# Patient Record
Sex: FEMALE | Race: BLACK OR AFRICAN AMERICAN | NOT HISPANIC OR LATINO | Employment: UNEMPLOYED | URBAN - METROPOLITAN AREA
[De-identification: names, ages, dates, MRNs, and addresses within clinical notes are randomized per-mention and may not be internally consistent; named-entity substitution may affect disease eponyms.]

---

## 2023-04-27 ENCOUNTER — OFFICE VISIT (OUTPATIENT)
Dept: PRIMARY CARE | Facility: CLINIC | Age: 33
End: 2023-04-27
Payer: COMMERCIAL

## 2023-04-27 VITALS
SYSTOLIC BLOOD PRESSURE: 158 MMHG | WEIGHT: 136.4 LBS | TEMPERATURE: 97.3 F | OXYGEN SATURATION: 97 % | BODY MASS INDEX: 22.73 KG/M2 | HEART RATE: 96 BPM | HEIGHT: 65 IN | DIASTOLIC BLOOD PRESSURE: 100 MMHG

## 2023-04-27 DIAGNOSIS — Z00.00 HEALTH CARE MAINTENANCE: ICD-10-CM

## 2023-04-27 DIAGNOSIS — I10 HYPERTENSION, UNSPECIFIED TYPE: ICD-10-CM

## 2023-04-27 DIAGNOSIS — M25.562 LEFT KNEE PAIN, UNSPECIFIED CHRONICITY: Primary | ICD-10-CM

## 2023-04-27 PROCEDURE — 99213 OFFICE O/P EST LOW 20 MIN: CPT | Performed by: STUDENT IN AN ORGANIZED HEALTH CARE EDUCATION/TRAINING PROGRAM

## 2023-04-27 PROCEDURE — 3077F SYST BP >= 140 MM HG: CPT | Performed by: STUDENT IN AN ORGANIZED HEALTH CARE EDUCATION/TRAINING PROGRAM

## 2023-04-27 PROCEDURE — 3080F DIAST BP >= 90 MM HG: CPT | Performed by: STUDENT IN AN ORGANIZED HEALTH CARE EDUCATION/TRAINING PROGRAM

## 2023-04-27 RX ORDER — AMLODIPINE BESYLATE 5 MG/1
5 TABLET ORAL DAILY
Qty: 30 TABLET | Refills: 5 | Status: SHIPPED | OUTPATIENT
Start: 2023-04-27 | End: 2023-12-04 | Stop reason: DRUGHIGH

## 2023-04-27 RX ORDER — NAPROXEN 500 MG/1
500 TABLET ORAL 2 TIMES DAILY PRN
Qty: 60 TABLET | Refills: 0 | Status: SHIPPED | OUTPATIENT
Start: 2023-04-27 | End: 2023-05-27

## 2023-04-27 ASSESSMENT — PAIN SCALES - GENERAL: PAINLEVEL: 0-NO PAIN
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
## 2023-10-10 PROBLEM — O13.9 GESTATIONAL HYPERTENSION (HHS-HCC): Status: ACTIVE | Noted: 2023-10-10

## 2023-10-10 PROBLEM — O09.93 ENCOUNTER FOR SUPERVISION OF HIGH RISK PREGNANCY IN THIRD TRIMESTER, ANTEPARTUM (HHS-HCC): Status: ACTIVE | Noted: 2023-10-10

## 2023-10-10 PROBLEM — T78.40XA ALLERGIES: Status: ACTIVE | Noted: 2023-10-10

## 2023-10-10 PROBLEM — Z87.59 HISTORY OF PRE-ECLAMPSIA: Status: ACTIVE | Noted: 2023-10-10

## 2023-10-10 PROBLEM — O09.893 HISTORY OF PRETERM DELIVERY, CURRENTLY PREGNANT IN THIRD TRIMESTER (HHS-HCC): Status: ACTIVE | Noted: 2023-10-10

## 2023-10-10 RX ORDER — CLINDAMYCIN PHOSPHATE 10 MG/G
GEL TOPICAL
COMMUNITY
Start: 2021-08-23 | End: 2023-11-01 | Stop reason: ALTCHOICE

## 2023-10-10 RX ORDER — GUAIFENESIN 600 MG/1
600 TABLET, EXTENDED RELEASE ORAL EVERY 12 HOURS
COMMUNITY
Start: 2022-04-26 | End: 2023-11-01 | Stop reason: ALTCHOICE

## 2023-10-10 RX ORDER — IBUPROFEN 600 MG/1
600 TABLET ORAL EVERY 6 HOURS PRN
COMMUNITY
Start: 2022-05-02 | End: 2023-11-01 | Stop reason: ALTCHOICE

## 2023-10-10 RX ORDER — DOCUSATE SODIUM 100 MG/1
100 CAPSULE, LIQUID FILLED ORAL 2 TIMES DAILY PRN
COMMUNITY
Start: 2022-05-02 | End: 2023-11-01 | Stop reason: ALTCHOICE

## 2023-10-10 RX ORDER — HYDROCORTISONE 1 %
CREAM (GRAM) TOPICAL
COMMUNITY
Start: 2022-03-31 | End: 2023-11-01 | Stop reason: ALTCHOICE

## 2023-10-10 RX ORDER — IPRATROPIUM BROMIDE 21 UG/1
2 SPRAY, METERED NASAL 2 TIMES DAILY
COMMUNITY
Start: 2017-02-25 | End: 2023-11-01 | Stop reason: ALTCHOICE

## 2023-10-10 RX ORDER — NIFEDIPINE 60 MG/1
1 TABLET, FILM COATED, EXTENDED RELEASE ORAL DAILY
COMMUNITY
Start: 2022-05-02 | End: 2023-11-01 | Stop reason: ALTCHOICE

## 2023-10-10 RX ORDER — LORATADINE 10 MG
1 TABLET,DISINTEGRATING ORAL DAILY
COMMUNITY
Start: 2022-04-26 | End: 2023-11-01 | Stop reason: ALTCHOICE

## 2023-10-10 RX ORDER — NAPROXEN 500 MG/1
500 TABLET ORAL
COMMUNITY
Start: 2015-10-02 | End: 2023-11-01 | Stop reason: ALTCHOICE

## 2023-10-10 RX ORDER — OXYCODONE AND ACETAMINOPHEN 5; 325 MG/1; MG/1
1 TABLET ORAL EVERY 4 HOURS PRN
COMMUNITY
Start: 2015-10-02 | End: 2023-11-01 | Stop reason: ALTCHOICE

## 2023-10-10 RX ORDER — BENZOYL PEROXIDE 50 MG/ML
LIQUID TOPICAL
COMMUNITY
Start: 2021-12-01 | End: 2023-11-01 | Stop reason: ALTCHOICE

## 2023-10-10 RX ORDER — TRAMADOL HYDROCHLORIDE 50 MG/1
50 TABLET ORAL EVERY 6 HOURS PRN
COMMUNITY
Start: 2023-02-25 | End: 2023-11-01 | Stop reason: ALTCHOICE

## 2023-10-11 ENCOUNTER — APPOINTMENT (OUTPATIENT)
Dept: OBSTETRICS AND GYNECOLOGY | Facility: HOSPITAL | Age: 33
End: 2023-10-11
Payer: COMMERCIAL

## 2023-11-01 ENCOUNTER — LAB (OUTPATIENT)
Dept: LAB | Facility: LAB | Age: 33
End: 2023-11-01
Payer: COMMERCIAL

## 2023-11-01 ENCOUNTER — INITIAL PRENATAL (OUTPATIENT)
Dept: OBSTETRICS AND GYNECOLOGY | Facility: HOSPITAL | Age: 33
End: 2023-11-01
Payer: COMMERCIAL

## 2023-11-01 VITALS — DIASTOLIC BLOOD PRESSURE: 86 MMHG | BODY MASS INDEX: 22.96 KG/M2 | WEIGHT: 138 LBS | SYSTOLIC BLOOD PRESSURE: 145 MMHG

## 2023-11-01 DIAGNOSIS — Z3A.12 12 WEEKS GESTATION OF PREGNANCY (HHS-HCC): ICD-10-CM

## 2023-11-01 DIAGNOSIS — O10.919 CHRONIC HYPERTENSION AFFECTING PREGNANCY (HHS-HCC): ICD-10-CM

## 2023-11-01 DIAGNOSIS — Z28.21 INFLUENZA VACCINATION DECLINED: ICD-10-CM

## 2023-11-01 DIAGNOSIS — O10.919 CHRONIC HYPERTENSION DURING PREGNANCY (HHS-HCC): ICD-10-CM

## 2023-11-01 DIAGNOSIS — O13.9 GESTATIONAL HYPERTENSION, ANTEPARTUM (HHS-HCC): ICD-10-CM

## 2023-11-01 DIAGNOSIS — Z11.3 ROUTINE SCREENING FOR STI (SEXUALLY TRANSMITTED INFECTION): ICD-10-CM

## 2023-11-01 DIAGNOSIS — Z00.00 HEALTH CARE MAINTENANCE: ICD-10-CM

## 2023-11-01 DIAGNOSIS — O09.893 HISTORY OF PRETERM DELIVERY, CURRENTLY PREGNANT IN THIRD TRIMESTER (HHS-HCC): ICD-10-CM

## 2023-11-01 DIAGNOSIS — O09.899 SUPERVISION OF OTHER HIGH RISK PREGNANCIES, UNSPECIFIED TRIMESTER (HHS-HCC): Primary | ICD-10-CM

## 2023-11-01 DIAGNOSIS — O09.899 SUPERVISION OF OTHER HIGH RISK PREGNANCIES, UNSPECIFIED TRIMESTER (HHS-HCC): ICD-10-CM

## 2023-11-01 DIAGNOSIS — Z87.59 HISTORY OF PRE-ECLAMPSIA: ICD-10-CM

## 2023-11-01 PROBLEM — O09.93 ENCOUNTER FOR SUPERVISION OF HIGH RISK PREGNANCY IN THIRD TRIMESTER, ANTEPARTUM (HHS-HCC): Status: RESOLVED | Noted: 2023-10-10 | Resolved: 2023-11-01

## 2023-11-01 PROBLEM — T78.40XA ALLERGIES: Status: RESOLVED | Noted: 2023-10-10 | Resolved: 2023-11-01

## 2023-11-01 LAB
ABO GROUP (TYPE) IN BLOOD: NORMAL
ALBUMIN SERPL BCP-MCNC: 3.9 G/DL (ref 3.4–5)
ALP SERPL-CCNC: 47 U/L (ref 33–110)
ALT SERPL W P-5'-P-CCNC: 19 U/L (ref 7–45)
ANION GAP SERPL CALC-SCNC: 12 MMOL/L (ref 10–20)
ANTIBODY SCREEN: NORMAL
AST SERPL W P-5'-P-CCNC: 18 U/L (ref 9–39)
BILIRUB SERPL-MCNC: 0.4 MG/DL (ref 0–1.2)
BUN SERPL-MCNC: 8 MG/DL (ref 6–23)
CALCIUM SERPL-MCNC: 9.9 MG/DL (ref 8.6–10.6)
CHLORIDE SERPL-SCNC: 103 MMOL/L (ref 98–107)
CO2 SERPL-SCNC: 25 MMOL/L (ref 21–32)
CREAT SERPL-MCNC: 0.54 MG/DL (ref 0.5–1.05)
ERYTHROCYTE [DISTWIDTH] IN BLOOD BY AUTOMATED COUNT: 13.9 % (ref 11.5–14.5)
GFR SERPL CREATININE-BSD FRML MDRD: >90 ML/MIN/1.73M*2
GLUCOSE SERPL-MCNC: 70 MG/DL (ref 74–99)
HBV SURFACE AG SERPL QL IA: NONREACTIVE
HCT VFR BLD AUTO: 35.1 % (ref 36–46)
HCV AB SER QL: NONREACTIVE
HGB BLD-MCNC: 11.6 G/DL (ref 12–16)
HIV 1+2 AB+HIV1 P24 AG SERPL QL IA: NONREACTIVE
MCH RBC QN AUTO: 30.9 PG (ref 26–34)
MCHC RBC AUTO-ENTMCNC: 33 G/DL (ref 32–36)
MCV RBC AUTO: 94 FL (ref 80–100)
NRBC BLD-RTO: 0 /100 WBCS (ref 0–0)
PLATELET # BLD AUTO: 355 X10*3/UL (ref 150–450)
POTASSIUM SERPL-SCNC: 4.4 MMOL/L (ref 3.5–5.3)
PREGNANCY TEST URINE, POC: POSITIVE
PROT SERPL-MCNC: 6.7 G/DL (ref 6.4–8.2)
RBC # BLD AUTO: 3.75 X10*6/UL (ref 4–5.2)
REFLEX ADDED, ANEMIA PANEL: NORMAL
RH FACTOR (ANTIGEN D): NORMAL
RUBV IGG SERPL IA-ACNC: 0.7 IA
RUBV IGG SERPL QL IA: NEGATIVE
SODIUM SERPL-SCNC: 136 MMOL/L (ref 136–145)
T PALLIDUM AB SER QL: NONREACTIVE
URATE SERPL-MCNC: <1.5 MG/DL (ref 2.3–6.7)
VARICELLA ZOSTER IGG INDEX: 5.2 IA
VZV IGG SER QL IA: POSITIVE
WBC # BLD AUTO: 10.5 X10*3/UL (ref 4.4–11.3)

## 2023-11-01 PROCEDURE — 80053 COMPREHEN METABOLIC PANEL: CPT

## 2023-11-01 PROCEDURE — 86317 IMMUNOASSAY INFECTIOUS AGENT: CPT

## 2023-11-01 PROCEDURE — 86780 TREPONEMA PALLIDUM: CPT

## 2023-11-01 PROCEDURE — 87340 HEPATITIS B SURFACE AG IA: CPT

## 2023-11-01 PROCEDURE — 86803 HEPATITIS C AB TEST: CPT

## 2023-11-01 PROCEDURE — 84550 ASSAY OF BLOOD/URIC ACID: CPT

## 2023-11-01 PROCEDURE — 87086 URINE CULTURE/COLONY COUNT: CPT | Performed by: ADVANCED PRACTICE MIDWIFE

## 2023-11-01 PROCEDURE — 83020 HEMOGLOBIN ELECTROPHORESIS: CPT

## 2023-11-01 PROCEDURE — 86900 BLOOD TYPING SEROLOGIC ABO: CPT

## 2023-11-01 PROCEDURE — 36415 COLL VENOUS BLD VENIPUNCTURE: CPT

## 2023-11-01 PROCEDURE — 83021 HEMOGLOBIN CHROMOTOGRAPHY: CPT

## 2023-11-01 PROCEDURE — 85027 COMPLETE CBC AUTOMATED: CPT

## 2023-11-01 PROCEDURE — 86901 BLOOD TYPING SEROLOGIC RH(D): CPT

## 2023-11-01 PROCEDURE — 99214 OFFICE O/P EST MOD 30 MIN: CPT | Performed by: ADVANCED PRACTICE MIDWIFE

## 2023-11-01 PROCEDURE — 86787 VARICELLA-ZOSTER ANTIBODY: CPT

## 2023-11-01 PROCEDURE — 87389 HIV-1 AG W/HIV-1&-2 AB AG IA: CPT

## 2023-11-01 PROCEDURE — 87661 TRICHOMONAS VAGINALIS AMPLIF: CPT | Performed by: ADVANCED PRACTICE MIDWIFE

## 2023-11-01 PROCEDURE — 86850 RBC ANTIBODY SCREEN: CPT

## 2023-11-01 PROCEDURE — 87800 DETECT AGNT MULT DNA DIREC: CPT | Performed by: ADVANCED PRACTICE MIDWIFE

## 2023-11-01 RX ORDER — NAPROXEN SODIUM 220 MG/1
81 TABLET, FILM COATED ORAL NIGHTLY
Qty: 30 TABLET | Refills: 11 | Status: SHIPPED | OUTPATIENT
Start: 2023-11-01 | End: 2023-11-02 | Stop reason: SDUPTHER

## 2023-11-01 SDOH — ECONOMIC STABILITY: FOOD INSECURITY: WITHIN THE PAST 12 MONTHS, THE FOOD YOU BOUGHT JUST DIDN'T LAST AND YOU DIDN'T HAVE MONEY TO GET MORE.: NEVER TRUE

## 2023-11-01 SDOH — ECONOMIC STABILITY: FOOD INSECURITY: WITHIN THE PAST 12 MONTHS, YOU WORRIED THAT YOUR FOOD WOULD RUN OUT BEFORE YOU GOT MONEY TO BUY MORE.: NEVER TRUE

## 2023-11-01 ASSESSMENT — ENCOUNTER SYMPTOMS
NEUROLOGICAL NEGATIVE: 0
DEPRESSION: 0
RESPIRATORY NEGATIVE: 0
CARDIOVASCULAR NEGATIVE: 0
CONSTITUTIONAL NEGATIVE: 0
LOSS OF SENSATION IN FEET: 0
OCCASIONAL FEELINGS OF UNSTEADINESS: 0
ENDOCRINE NEGATIVE: 0
GASTROINTESTINAL NEGATIVE: 0
HEMATOLOGIC/LYMPHATIC NEGATIVE: 0
ALLERGIC/IMMUNOLOGIC NEGATIVE: 0
EYES NEGATIVE: 0
MUSCULOSKELETAL NEGATIVE: 0
PSYCHIATRIC NEGATIVE: 0

## 2023-11-01 ASSESSMENT — EDINBURGH POSTNATAL DEPRESSION SCALE (EPDS)
I HAVE BLAMED MYSELF UNNECESSARILY WHEN THINGS WENT WRONG: NOT VERY OFTEN
TOTAL SCORE: 3
I HAVE BEEN SO UNHAPPY THAT I HAVE HAD DIFFICULTY SLEEPING: NOT AT ALL
THINGS HAVE BEEN GETTING ON TOP OF ME: NO, I HAVE BEEN COPING AS WELL AS EVER
I HAVE BEEN SO UNHAPPY THAT I HAVE BEEN CRYING: NO, NEVER
I HAVE BEEN ABLE TO LAUGH AND SEE THE FUNNY SIDE OF THINGS: AS MUCH AS I ALWAYS COULD
THE THOUGHT OF HARMING MYSELF HAS OCCURRED TO ME: NEVER
I HAVE FELT SAD OR MISERABLE: NO, NOT AT ALL
I HAVE FELT SCARED OR PANICKY FOR NO GOOD REASON: NO, NOT AT ALL
I HAVE BEEN ANXIOUS OR WORRIED FOR NO GOOD REASON: YES, SOMETIMES
I HAVE LOOKED FORWARD WITH ENJOYMENT TO THINGS: AS MUCH AS I EVER DID

## 2023-11-01 NOTE — PROGRESS NOTES
Subjective   Serfain Wall is a 33 y.o.  at 12w2d with a working estimated date of delivery of 2024, by sure Last Menstrual Period who presents for an initial prenatal visit.     Patient has a hx of cHTN. She was prescribed amlodipine, but hasn't been taking it for the last few month. Patient denies any headaches/vision changes.     Patient currently experiencing: nausea, not vomiting  Bleeding or cramping since LMP: no    Ultrasound completed this pregnancy: No    Taking prenatal vitamin: Yes    Last pap:  at [facility], WNL per patient report, patient denies hx of abnormal pap smears.    Patient declines flu shot.     Postpartum Depression: Low Risk  (2023)    [facility]  Depression Scale     Last EPDS Total Score: 3     Last EPDS Self Harm Result: Never        OB History    Para Term  AB Living   4 2 1 1 1 2   SAB IAB Ectopic Multiple Live Births     1     2      # Outcome Date GA Lbr Harman/2nd Weight Sex Delivery Anes PTL Lv   4 Current            3 Term 22   2155 g F Vag-Spont   CRYSTAL      Complications: Gestational hypertension   2 IAB            1  12 35w0d  2268 g M Vag-Spont   CRYSTAL      Complications: Preeclampsia     [facility]  Depression Scale Total: 3  Prior pregnancy complications:   birth, pre-e  History of hypertension:  Yes, pre-e and gHTN    Past Medical History:   Diagnosis Date    Hypertension     Trichomonas infection 07/15/2011    at [facility] in pap      Past Surgical History:   Procedure Laterality Date    CT NECK ANGIO W AND WO IV CONTRAST  2023    [place] N[place] [facility] [place]    D&C FIRST TRIMESTER / TX INCOMPLETE / MISSED / SEPTIC / INDUCED   2013      Social History     Tobacco Use    Smoking status: Former     Types: Cigarettes     Passive exposure: Never    Smokeless tobacco: Never   Vaping Use    Vaping Use: Never used   Substance Use Topics    Alcohol use: Not Currently    Drug use: Never         Objective   Physical Exam  Weight: 62.6 kg (138 lb)  Pregravid BMI: 21.63  BP: 145/86    Physical Exam  Constitutional:       Appearance: Normal appearance.   Cardiovascular:      Rate and Rhythm: Normal rate and regular rhythm.      Heart sounds: Normal heart sounds.   Pulmonary:      Effort: Pulmonary effort is normal.      Breath sounds: Normal breath sounds.   Chest:   Breasts:     Right: Normal.      Left: Normal.   Skin:     General: Skin is warm and dry.   Neurological:      Mental Status: She is alert.   Psychiatric:         Mood and Affect: Mood normal.         Behavior: Behavior normal.         Thought Content: Thought content normal.         Judgment: Judgment normal.         Problem List Items Addressed This Visit       History of  delivery, currently pregnant in third trimester    Overview       35w IOL per patient for pre-e  MFM referral placed         Gestational hypertension    Overview       With  pregnancy          History of pre-eclampsia    Overview       With  and  births  IOL at 35w due to pre-e  HELLP labs ordered at new OB  ASA 81mg rx sent         Supervision of other high risk pregnancies, unspecified trimester - Primary    Relevant Medications    aspirin 81 mg chewable tablet    Other Relevant Orders    C. Trachomatis / N. Gonorrhoeae, Amplified Detection    CBC Anemia Panel With Reflex,Pregnancy    Hemoglobin Identification with Path Review    Hepatitis B Surface Antigen    Hepatitis C Antibody    HIV 1/2 Antigen/Antibody Screen with Reflex to Confirmation    Rubella Antibody, IgG    Syphilis Screen with Reflex    TRICH VAGINALIS, AMPLIFIED    Type And Screen    Urine Culture    Varicella Zoster Antibody, IgG    Comprehensive Metabolic Panel    Protein, Urine Random    Uric Acid    US MAC OB imaging order    Referral to Maternal Fetal Medicine    POC pregnancy, urine    Chronic hypertension during pregnancy    Overview       MD only due to cHTN on medication  HELLP  labs ordered at new OB  ASA 81mg rx sent  Patient has BP cuff at home; discussed checking Bps 1-2x a day and calling with elevated BPs          Other Visit Diagnoses       Routine screening for STI (sexually transmitted infection)        Relevant Orders    C. Trachomatis / N. Gonorrhoeae, Amplified Detection    CBC Anemia Panel With Reflex,Pregnancy    Hemoglobin Identification with Path Review    Hepatitis B Surface Antigen    Hepatitis C Antibody    HIV 1/2 Antigen/Antibody Screen with Reflex to Confirmation    Rubella Antibody, IgG    Syphilis Screen with Reflex    TRICH VAGINALIS, AMPLIFIED    Type And Screen    Urine Culture    Varicella Zoster Antibody, IgG    Comprehensive Metabolic Panel    Protein, Urine Random    Uric Acid    Health care maintenance        Relevant Medications    prenatal vitamin, iron-folic, 27 mg iron-800 mcg folic acid tablet    12 weeks gestation of pregnancy        Chronic hypertension affecting pregnancy        Influenza vaccination declined                Plan   Records release signed for pap results  NOB plan: New OB resources provided and reviewed with particular attention to dietary, travel, and medication restrictions  Oriented to practice, CNM vs. MD care  Reviewed IOM recommendations for weight gain given pt's BMI: 25-35 pounds (BMI 18.5 - 24.9)  Reviewed bleeding precautions, warning signs, when to call provider; phone number provided  Discussed bASA for PEC prophylaxis  The following Rx were sent to pharmacy: PNV, ASA 81mg  Routine NOB labs ordered  Additional labs added: HELLP labs  Discussed Centering Pregnancy with patient, patient declined due to needing to going to education and MD care   Dating ultrasound ordered  MFM consult placed for hx of PTB  Return in 4 weeks for routine prenatal care with MD only due to cHTN on medication   *next visit: genetic testing     Concha Anton, BLANCHE

## 2023-11-02 ENCOUNTER — TELEPHONE (OUTPATIENT)
Dept: OBSTETRICS AND GYNECOLOGY | Facility: HOSPITAL | Age: 33
End: 2023-11-02
Payer: COMMERCIAL

## 2023-11-02 DIAGNOSIS — O09.899 SUPERVISION OF OTHER HIGH RISK PREGNANCIES, UNSPECIFIED TRIMESTER (HHS-HCC): ICD-10-CM

## 2023-11-02 DIAGNOSIS — Z00.00 HEALTH CARE MAINTENANCE: ICD-10-CM

## 2023-11-02 PROBLEM — Z28.39 MATERNAL VARICELLA, NON-IMMUNE (HHS-HCC): Status: ACTIVE | Noted: 2023-11-01

## 2023-11-02 LAB
BACTERIA UR CULT: NORMAL
C TRACH RRNA SPEC QL NAA+PROBE: NEGATIVE
N GONORRHOEA DNA SPEC QL PROBE+SIG AMP: NEGATIVE
T VAGINALIS RRNA SPEC QL NAA+PROBE: NEGATIVE

## 2023-11-02 RX ORDER — NAPROXEN SODIUM 220 MG/1
81 TABLET, FILM COATED ORAL NIGHTLY
Qty: 30 TABLET | Refills: 11 | Status: SHIPPED | OUTPATIENT
Start: 2023-11-02 | End: 2024-01-09 | Stop reason: ALTCHOICE

## 2023-11-02 ASSESSMENT — ENCOUNTER SYMPTOMS
PALPITATIONS: 0
NECK PAIN: 0
SWEATS: 0
HEADACHES: 0
PND: 0
ORTHOPNEA: 0
BLURRED VISION: 0
SHORTNESS OF BREATH: 0
HYPERTENSION: 1

## 2023-11-02 NOTE — TELEPHONE ENCOUNTER
Called patient to discuss test results  Patient identified by name and   Informed patient that her rubella testing was reported as non immmune and to refrain from being around anyone who was possibly exposed to this  Also informed patient that her urine sample for her protein test was not run due to a lab error, order is placed, instructed patient to present to any  lab to complete this testing  Patient states she will go Monday  While on the phone patient asked if [facility]  Message sent to provider to resend these for her  Encouraged patient to call office with any questions or concerns  Tiffany Nieto RN

## 2023-11-03 LAB
HEMOGLOBIN A2: 2.8 % (ref 2–3.5)
HEMOGLOBIN A: 95.5 % (ref 95.8–98)
HEMOGLOBIN F: 1.7 % (ref 0–2)
HEMOGLOBIN IDENTIFICATION INTERPRETATION: NORMAL
PATH REVIEW-HGB IDENTIFICATION: ABNORMAL

## 2023-11-06 ENCOUNTER — APPOINTMENT (OUTPATIENT)
Dept: RADIOLOGY | Facility: HOSPITAL | Age: 33
End: 2023-11-06
Payer: COMMERCIAL

## 2023-11-06 ENCOUNTER — INITIAL PRENATAL (OUTPATIENT)
Dept: MATERNAL FETAL MEDICINE | Facility: HOSPITAL | Age: 33
End: 2023-11-06
Payer: COMMERCIAL

## 2023-11-06 ENCOUNTER — HOSPITAL ENCOUNTER (OUTPATIENT)
Dept: RADIOLOGY | Facility: HOSPITAL | Age: 33
Discharge: HOME | End: 2023-11-06
Payer: COMMERCIAL

## 2023-11-06 VITALS — BODY MASS INDEX: 23.63 KG/M2 | WEIGHT: 142 LBS | DIASTOLIC BLOOD PRESSURE: 84 MMHG | SYSTOLIC BLOOD PRESSURE: 143 MMHG

## 2023-11-06 DIAGNOSIS — O09.899 SUPERVISION OF OTHER HIGH RISK PREGNANCIES, UNSPECIFIED TRIMESTER (HHS-HCC): ICD-10-CM

## 2023-11-06 DIAGNOSIS — O10.919 CHRONIC HYPERTENSION DURING PREGNANCY (HHS-HCC): ICD-10-CM

## 2023-11-06 DIAGNOSIS — Z87.59 HISTORY OF PRE-ECLAMPSIA: Primary | ICD-10-CM

## 2023-11-06 DIAGNOSIS — Z34.90 PREGNANCY (HHS-HCC): ICD-10-CM

## 2023-11-06 DIAGNOSIS — Z3A.13 13 WEEKS GESTATION OF PREGNANCY (HHS-HCC): ICD-10-CM

## 2023-11-06 LAB
CREAT UR-MCNC: 70 MG/DL (ref 20–320)
PROT UR-ACNC: 8 MG/DL (ref 5–24)
PROT/CREAT UR: 0.11 MG/MG CREAT (ref 0–0.17)

## 2023-11-06 PROCEDURE — 76813 OB US NUCHAL MEAS 1 GEST: CPT

## 2023-11-06 PROCEDURE — 99214 OFFICE O/P EST MOD 30 MIN: CPT | Performed by: OBSTETRICS & GYNECOLOGY

## 2023-11-06 PROCEDURE — 99214 OFFICE O/P EST MOD 30 MIN: CPT | Mod: 25 | Performed by: OBSTETRICS & GYNECOLOGY

## 2023-11-06 PROCEDURE — 82570 ASSAY OF URINE CREATININE: CPT | Performed by: OBSTETRICS & GYNECOLOGY

## 2023-11-06 PROCEDURE — 76813 OB US NUCHAL MEAS 1 GEST: CPT | Performed by: OBSTETRICS & GYNECOLOGY

## 2023-11-06 NOTE — PROGRESS NOTES
2023   Serafin Wall     [facility] CONSULT NOTE  Referring Clinician: BLANCHE Anton  Reason for consult: Chronic hypertension and h/o PEC    HPI: Serafin Wall is a 33 y.o.  at 13w0d here for consult for chronic hypertension on medications and history of PEC.     Patient has no complaints today. She denies any nausea or vomiting. Denies any bleeding or abdominal pain.     Other pregnancy complications include:  - H/o iatrogenic  at 35w (induced for sPEC)    10 point review of system is negative except as above    OB History          4    Para   2    Term   1       1    AB   1    Living   2         SAB        IAB   1    Ectopic        Multiple        Live Births   2                   Past medical history:   - Chronic hypertension    Denies DM, asthma, depression, or thyroid issues    Past Surgical History:   Procedure Laterality Date    CT NECK ANGIO W AND WO IV CONTRAST  2023    CT NECK ANGIO W AND WO IV CONTRAST [facility] CT    D&C FIRST TRIMESTER / TX INCOMPLETE / MISSED / SEPTIC / INDUCED          Medications: Amlodipine 5mg daily    No Known Allergies    Social History     Tobacco Use    Smoking status: Former     Types: Cigarettes     Passive exposure: Never    Smokeless tobacco: Never   Vaping Use    Vaping Use: Never used   Substance Use Topics    Alcohol use: Not Currently    Drug use: Never       family history includes Heart disease in her paternal grandmother.      OBJECTIVE  Visit Vitals  /84   Wt 64.4 kg (142 lb)   LMP 2023 (Exact Date)   BMI 23.63 kg/m²   OB Status Pregnant   Smoking Status Former   BSA 1.72 m²       Physical exam  Gen: alert and oriented  Cardio: warm and well perfused  FHT: For ultrasound after visit today    ASSESSMENT & PLAN    Serafin Wall is a 33 y.o.  at 13w0d here for the following:    #) Chronic hypertension   - Diagnosed in  as hypertension did not resolve after delivery    - H/o PEC () and gHTN ()  in prior pregnancies  - BP in clinic 143/84. Not checking home BP but does have cuff  - Baseline PEC labs normal. Still needs P/C ratio, which was ordered today  - Current regimen: Amlodipine 5mg daily   - Continue low dose ASA  - Discussed risks of cHTN during pregnancy including increased risks of worsening cHTN, preeclampsia/eclampsia, end-organ damage, placental abruption and fetal risks including growth restriction,  delivery, and stillbirth  - Per CHAP trial, recommendation of goal BP targets of <140/90 was associated with improved outcomes including a lower risk of preeclampsia with severe features, medically-indicated  birth <35w, and placental abruption. (Leni et.al. )    - Recommend obtaining maternal echo given longstanding cHTN (ordered) and continued home BP monitoring.   - We discussed delivery timing between 37-38w given cHTN on medications. Additionally, we recommend  surveillance starting at 32w     #) H/o iatrogenic  delivery   - 35w IOL for sPEC    #) Routine PNC  - Rh+, no antibodies. Rubella non-immune and would recommend MMR vaccination postpartum   - First trimester labs reviewed and normal  - Patient interested in genetics referral (order placed)   - Continue PNV    Thank you for allowing us to participate in the care of your patient. We anticipate she should be able to continue her care under your supervision. Please do not hesitate to contact us should any concerns arise.    Kim Johnson MD     Patient seen and evaluated with Dr. Cerrato.    Citations:  Leni AT, Hernán JM, Martita K, Alfred L, Andre B, Eric K, Madhuri BL, Ashia J, Ariadna K, Chaparro RK, Obi K, Rasheed DM, Diego L, Michelle T, Kurt B, Esplin S, Srikanth S, Auguste M, Andrae GR, Sanjiv KK, Alda J, Snehal M, Sandee MY, Kevin HN, India H, Pierce T, Emiliano A, Pearce S, August P, Ricardo UM, Milton W, Vivian E, Jj I, Estrella N, Brian ME, Danyell D, El-Sayed YY, Sofia D, Imani ZS, Malini L,  Dk N, Maulik NL, Constantino S, Sujit GR, Fili WW; Chronic Hypertension and Pregnancy (CHAP) Trial Consortium. Treatment for Mild Chronic Hypertension during Pregnancy. N Engl J Med. 2022 May 12;386(19):1993-2021. doi: 10.1056/URGUlb6150615. Epub 2022 Apr 2. PMID: 75797310; PMCID: [id number].

## 2023-11-08 ASSESSMENT — ENCOUNTER SYMPTOMS
SHORTNESS OF BREATH: 0
HYPERTENSION: 1
ORTHOPNEA: 0
PALPITATIONS: 0
HEADACHES: 1
BLURRED VISION: 0
PND: 0
NECK PAIN: 0
SWEATS: 0

## 2023-11-14 ENCOUNTER — TELEPHONE (OUTPATIENT)
Dept: OBSTETRICS AND GYNECOLOGY | Facility: HOSPITAL | Age: 33
End: 2023-11-14
Payer: COMMERCIAL

## 2023-11-14 ENCOUNTER — APPOINTMENT (OUTPATIENT)
Dept: GENETICS | Facility: HOSPITAL | Age: 33
End: 2023-11-14
Payer: COMMERCIAL

## 2023-11-14 ASSESSMENT — ENCOUNTER SYMPTOMS
HEADACHES: 1
SHORTNESS OF BREATH: 0
BLURRED VISION: 0
SWEATS: 0
PND: 0
HYPERTENSION: 1
PALPITATIONS: 0
ORTHOPNEA: 0
NECK PAIN: 0

## 2023-11-14 NOTE — TELEPHONE ENCOUNTER
Patient called office requesting [facility]  Patient states she has refills  Instructed patient to contact pharmacy she would like to transfer rx to and they will call the pharmacy with the prescription on file to transfer it over  Patient verbalized understanding and will contact the pharmacy  Encouraged patient to call office with any other questions or concerns  Tiffany Nieto RN

## 2023-11-28 ENCOUNTER — APPOINTMENT (OUTPATIENT)
Dept: GENETICS | Facility: HOSPITAL | Age: 33
End: 2023-11-28
Payer: COMMERCIAL

## 2023-12-04 ENCOUNTER — ROUTINE PRENATAL (OUTPATIENT)
Dept: OBSTETRICS AND GYNECOLOGY | Facility: HOSPITAL | Age: 33
End: 2023-12-04
Payer: COMMERCIAL

## 2023-12-04 VITALS — BODY MASS INDEX: 24.63 KG/M2 | DIASTOLIC BLOOD PRESSURE: 79 MMHG | SYSTOLIC BLOOD PRESSURE: 143 MMHG | WEIGHT: 148 LBS

## 2023-12-04 DIAGNOSIS — O10.919 CHRONIC HYPERTENSION DURING PREGNANCY (HHS-HCC): Primary | ICD-10-CM

## 2023-12-04 PROCEDURE — 99213 OFFICE O/P EST LOW 20 MIN: CPT | Mod: TH | Performed by: OBSTETRICS & GYNECOLOGY

## 2023-12-04 PROCEDURE — 99213 OFFICE O/P EST LOW 20 MIN: CPT | Performed by: OBSTETRICS & GYNECOLOGY

## 2023-12-04 RX ORDER — AMLODIPINE BESYLATE 10 MG/1
10 TABLET ORAL DAILY
Qty: 30 TABLET | Refills: 2 | Status: SHIPPED | OUTPATIENT
Start: 2023-12-04 | End: 2023-12-26

## 2023-12-04 ASSESSMENT — ENCOUNTER SYMPTOMS
ENDOCRINE NEGATIVE: 0
RESPIRATORY NEGATIVE: 0
MUSCULOSKELETAL NEGATIVE: 0
CARDIOVASCULAR NEGATIVE: 0
HEMATOLOGIC/LYMPHATIC NEGATIVE: 0
NEUROLOGICAL NEGATIVE: 0
EYES NEGATIVE: 0
PSYCHIATRIC NEGATIVE: 0
GASTROINTESTINAL NEGATIVE: 0
CONSTITUTIONAL NEGATIVE: 0
ALLERGIC/IMMUNOLOGIC NEGATIVE: 0

## 2023-12-04 NOTE — PROGRESS NOTES
17 week Ob  Still some fatigue. Nausea and improved.   Saw MFM, recommended maternal echo. Has not been called to schedule.     Problem List Items Addressed This Visit       Chronic hypertension during pregnancy - Primary    Overview       MD only due to cHTN on medication  HELLP labs WNL at new OB p/c 0.11  ASA 81mg rx sent  Patient has BP cuff at home; discussed checking Bps 1-2x a day and calling with elevated Bps  12/4/23 Patient reports BP at home in 140s/80s. Amlodipine increased to 10 mg Daily.   Will plan for serial growth USNs, Fetal testing at 32 weeks         Relevant Medications    amLODIPine (Norvasc) 10 mg tablet

## 2023-12-19 ENCOUNTER — HOSPITAL ENCOUNTER (OUTPATIENT)
Dept: RADIOLOGY | Facility: HOSPITAL | Age: 33
Discharge: HOME | End: 2023-12-19
Payer: COMMERCIAL

## 2023-12-19 DIAGNOSIS — Z34.90 PREGNANCY (HHS-HCC): ICD-10-CM

## 2023-12-19 PROCEDURE — 76811 OB US DETAILED SNGL FETUS: CPT | Performed by: OBSTETRICS & GYNECOLOGY

## 2023-12-19 PROCEDURE — 76811 OB US DETAILED SNGL FETUS: CPT

## 2023-12-24 DIAGNOSIS — O10.919 CHRONIC HYPERTENSION DURING PREGNANCY (HHS-HCC): ICD-10-CM

## 2023-12-26 RX ORDER — AMLODIPINE BESYLATE 10 MG/1
10 TABLET ORAL DAILY
Qty: 90 TABLET | Refills: 1 | Status: SHIPPED | OUTPATIENT
Start: 2023-12-26

## 2024-01-03 ENCOUNTER — HOSPITAL ENCOUNTER (OUTPATIENT)
Dept: CARDIOLOGY | Facility: HOSPITAL | Age: 34
Discharge: HOME | End: 2024-01-03
Payer: COMMERCIAL

## 2024-01-03 ENCOUNTER — HOSPITAL ENCOUNTER (OUTPATIENT)
Dept: RADIOLOGY | Facility: HOSPITAL | Age: 34
Discharge: HOME | End: 2024-01-03
Payer: COMMERCIAL

## 2024-01-03 DIAGNOSIS — Z87.59 HISTORY OF PRE-ECLAMPSIA: ICD-10-CM

## 2024-01-03 DIAGNOSIS — I10 ESSENTIAL (PRIMARY) HYPERTENSION: ICD-10-CM

## 2024-01-03 DIAGNOSIS — O10.919 CHRONIC HYPERTENSION IN PREGNANCY (HHS-HCC): ICD-10-CM

## 2024-01-03 DIAGNOSIS — O09.899 SUPERVISION OF OTHER HIGH RISK PREGNANCIES, UNSPECIFIED TRIMESTER (HHS-HCC): ICD-10-CM

## 2024-01-03 LAB
AORTIC VALVE MEAN GRADIENT: 7
AORTIC VALVE PEAK VELOCITY: 1.84
AV PEAK GRADIENT: 13.5
AVA (PEAK VEL): 1.85
AVA (VTI): 1.85
EJECTION FRACTION APICAL 4 CHAMBER: 65.4
EJECTION FRACTION: 68
LEFT ATRIUM VOLUME AREA LENGTH INDEX BSA: 32.6
LEFT VENTRICLE INTERNAL DIMENSION DIASTOLE: 4.4 (ref 3.5–6)
LEFT VENTRICULAR OUTFLOW TRACT DIAMETER: 1.7
MITRAL VALVE E/A RATIO: 1.19
MITRAL VALVE E/E' RATIO: 8.83
RIGHT VENTRICLE FREE WALL PEAK S': 15
TRICUSPID ANNULAR PLANE SYSTOLIC EXCURSION: 2.6

## 2024-01-03 PROCEDURE — 93306 TTE W/DOPPLER COMPLETE: CPT

## 2024-01-03 PROCEDURE — 76815 OB US LIMITED FETUS(S): CPT

## 2024-01-03 PROCEDURE — 76816 OB US FOLLOW-UP PER FETUS: CPT | Performed by: OBSTETRICS & GYNECOLOGY

## 2024-01-03 PROCEDURE — 93306 TTE W/DOPPLER COMPLETE: CPT | Performed by: INTERNAL MEDICINE

## 2024-01-03 PROCEDURE — 76816 OB US FOLLOW-UP PER FETUS: CPT

## 2024-01-09 ENCOUNTER — ROUTINE PRENATAL (OUTPATIENT)
Dept: OBSTETRICS AND GYNECOLOGY | Facility: HOSPITAL | Age: 34
End: 2024-01-09
Payer: COMMERCIAL

## 2024-01-09 VITALS — DIASTOLIC BLOOD PRESSURE: 82 MMHG | BODY MASS INDEX: 25.29 KG/M2 | WEIGHT: 152 LBS | SYSTOLIC BLOOD PRESSURE: 140 MMHG

## 2024-01-09 DIAGNOSIS — O09.893 HISTORY OF PRETERM DELIVERY, CURRENTLY PREGNANT IN THIRD TRIMESTER (HHS-HCC): Primary | ICD-10-CM

## 2024-01-09 DIAGNOSIS — Z3A.22 22 WEEKS GESTATION OF PREGNANCY (HHS-HCC): ICD-10-CM

## 2024-01-09 DIAGNOSIS — Z28.39 MATERNAL VARICELLA, NON-IMMUNE (HHS-HCC): ICD-10-CM

## 2024-01-09 DIAGNOSIS — O09.899 MATERNAL VARICELLA, NON-IMMUNE (HHS-HCC): ICD-10-CM

## 2024-01-09 DIAGNOSIS — O10.919 CHRONIC HYPERTENSION DURING PREGNANCY (HHS-HCC): ICD-10-CM

## 2024-01-09 PROCEDURE — 99213 OFFICE O/P EST LOW 20 MIN: CPT | Mod: TH | Performed by: OBSTETRICS & GYNECOLOGY

## 2024-01-09 PROCEDURE — 99213 OFFICE O/P EST LOW 20 MIN: CPT | Performed by: OBSTETRICS & GYNECOLOGY

## 2024-01-09 RX ORDER — ASPIRIN 81 MG/1
162 TABLET ORAL DAILY
Qty: 60 TABLET | Refills: 11 | Status: SHIPPED | OUTPATIENT
Start: 2024-01-09 | End: 2024-04-24 | Stop reason: HOSPADM

## 2024-01-09 NOTE — PROGRESS NOTES
22.1 week OB visit.   CHTN- on Amlodipine 10 mg daily. BP today 140/82. Did not take meds this AM. Discussed checking it at home.   Baseline PCR 0.11  USN growth 59%ile, normal anatomy. Next USN schedule for 28 weeks.   Maternal echo normal, EF 65-70%    Problem List Items Addressed This Visit       History of  delivery, currently pregnant in third trimester - Primary    Overview       35w IOL per patient for pre-e  MFM referral placed         Maternal varicella, non-immune    Overview       Offer varivax PP         Chronic hypertension during pregnancy    Overview       MD only due to cHTN on medication  HELLP labs WNL at new OB p/c 0.11  ASA 81mg rx sent  Patient has BP cuff at home; discussed checking Bps 1-2x a day and calling with elevated Bps  23 Patient reports BP at home in 140s/80s. Amlodipine increased to 10 mg Daily.   Will plan for serial growth USNs, Fetal testing at 32 weeks          Other Visit Diagnoses       22 weeks gestation of pregnancy

## 2024-02-20 ENCOUNTER — HOSPITAL ENCOUNTER (OUTPATIENT)
Dept: RADIOLOGY | Facility: HOSPITAL | Age: 34
Discharge: HOME | End: 2024-02-20
Payer: COMMERCIAL

## 2024-02-20 DIAGNOSIS — O09.899 SUPERVISION OF OTHER HIGH RISK PREGNANCIES, UNSPECIFIED TRIMESTER (HHS-HCC): ICD-10-CM

## 2024-02-20 PROCEDURE — 76816 OB US FOLLOW-UP PER FETUS: CPT

## 2024-02-20 PROCEDURE — 76816 OB US FOLLOW-UP PER FETUS: CPT | Performed by: OBSTETRICS & GYNECOLOGY

## 2024-02-20 PROCEDURE — 76819 FETAL BIOPHYS PROFIL W/O NST: CPT

## 2024-02-20 PROCEDURE — 76819 FETAL BIOPHYS PROFIL W/O NST: CPT | Performed by: OBSTETRICS & GYNECOLOGY

## 2024-03-11 ENCOUNTER — HOSPITAL ENCOUNTER (OUTPATIENT)
Facility: HOSPITAL | Age: 34
Discharge: HOME | End: 2024-03-11
Attending: OBSTETRICS & GYNECOLOGY | Admitting: OBSTETRICS & GYNECOLOGY
Payer: COMMERCIAL

## 2024-03-11 ENCOUNTER — HOSPITAL ENCOUNTER (OUTPATIENT)
Facility: HOSPITAL | Age: 34
End: 2024-03-11
Attending: OBSTETRICS & GYNECOLOGY | Admitting: OBSTETRICS & GYNECOLOGY
Payer: COMMERCIAL

## 2024-03-11 ENCOUNTER — ROUTINE PRENATAL (OUTPATIENT)
Dept: OBSTETRICS AND GYNECOLOGY | Facility: HOSPITAL | Age: 34
End: 2024-03-11
Payer: COMMERCIAL

## 2024-03-11 VITALS — WEIGHT: 173 LBS | SYSTOLIC BLOOD PRESSURE: 161 MMHG | BODY MASS INDEX: 28.79 KG/M2 | DIASTOLIC BLOOD PRESSURE: 94 MMHG

## 2024-03-11 VITALS
RESPIRATION RATE: 18 BRPM | DIASTOLIC BLOOD PRESSURE: 74 MMHG | BODY MASS INDEX: 29.09 KG/M2 | SYSTOLIC BLOOD PRESSURE: 128 MMHG | HEIGHT: 65 IN | WEIGHT: 174.6 LBS | OXYGEN SATURATION: 99 % | HEART RATE: 87 BPM | TEMPERATURE: 96.3 F

## 2024-03-11 DIAGNOSIS — Z87.59 HISTORY OF GESTATIONAL HYPERTENSION: ICD-10-CM

## 2024-03-11 DIAGNOSIS — Z28.39 MATERNAL VARICELLA, NON-IMMUNE (HHS-HCC): ICD-10-CM

## 2024-03-11 DIAGNOSIS — O09.899 MATERNAL VARICELLA, NON-IMMUNE (HHS-HCC): ICD-10-CM

## 2024-03-11 DIAGNOSIS — O10.919 CHRONIC HYPERTENSION DURING PREGNANCY (HHS-HCC): ICD-10-CM

## 2024-03-11 LAB
ALBUMIN SERPL BCP-MCNC: 3.3 G/DL (ref 3.4–5)
ALP SERPL-CCNC: 101 U/L (ref 33–110)
ALT SERPL W P-5'-P-CCNC: 13 U/L (ref 7–45)
ANION GAP SERPL CALC-SCNC: 14 MMOL/L (ref 10–20)
AST SERPL W P-5'-P-CCNC: 12 U/L (ref 9–39)
BILIRUB SERPL-MCNC: 0.2 MG/DL (ref 0–1.2)
BILIRUBIN, POC: NEGATIVE
BLOOD URINE, POC: NEGATIVE
BUN SERPL-MCNC: 7 MG/DL (ref 6–23)
CALCIUM SERPL-MCNC: 8.4 MG/DL (ref 8.6–10.6)
CHLORIDE SERPL-SCNC: 106 MMOL/L (ref 98–107)
CLARITY, POC: CLEAR
CO2 SERPL-SCNC: 19 MMOL/L (ref 21–32)
COLOR, POC: YELLOW
CREAT SERPL-MCNC: 0.41 MG/DL (ref 0.5–1.05)
CREAT UR-MCNC: 152.7 MG/DL (ref 20–320)
EGFRCR SERPLBLD CKD-EPI 2021: >90 ML/MIN/1.73M*2
ERYTHROCYTE [DISTWIDTH] IN BLOOD BY AUTOMATED COUNT: 13.6 % (ref 11.5–14.5)
FERRITIN SERPL-MCNC: 30 NG/ML
FOLATE SERPL-MCNC: >24 NG/ML
GLUCOSE 1H P 50 G GLC PO SERPL-MCNC: 99 MG/DL
GLUCOSE SERPL-MCNC: 99 MG/DL (ref 74–99)
GLUCOSE URINE, POC: NEGATIVE
HCT VFR BLD AUTO: 30.4 % (ref 36–46)
HGB BLD-MCNC: 10 G/DL (ref 12–16)
HGB RETIC QN: 35 PG (ref 28–38)
HOLD SPECIMEN: NORMAL
IMMATURE RETIC FRACTION: 18.3 %
IRON SATN MFR SERPL: 15 %
IRON SERPL-MCNC: 62 UG/DL
KETONES, POC: NEGATIVE
LDH SERPL L TO P-CCNC: 139 U/L (ref 84–246)
LEUKOCYTE EST, POC: NORMAL
MCH RBC QN AUTO: 30.8 PG (ref 26–34)
MCHC RBC AUTO-ENTMCNC: 32.9 G/DL (ref 32–36)
MCV RBC AUTO: 94 FL (ref 80–100)
NITRITE, POC: NEGATIVE
NRBC BLD-RTO: 0 /100 WBCS (ref 0–0)
PH, POC: 7
PLATELET # BLD AUTO: 350 X10*3/UL (ref 150–450)
POC APPEARANCE OF BODY FLUID: CLEAR
POTASSIUM SERPL-SCNC: 3.4 MMOL/L (ref 3.5–5.3)
PROT SERPL-MCNC: 5.6 G/DL (ref 6.4–8.2)
PROT UR-ACNC: 28 MG/DL (ref 5–24)
PROT/CREAT UR: 0.18 MG/MG CREAT (ref 0–0.17)
RBC # BLD AUTO: 3.25 X10*6/UL (ref 4–5.2)
RETICS #: 0.07 X10*6/UL (ref 0.02–0.08)
RETICS/RBC NFR AUTO: 2.1 % (ref 0.5–2)
SODIUM SERPL-SCNC: 136 MMOL/L (ref 136–145)
SPECIFIC GRAVITY, POC: 1.03
TIBC SERPL-MCNC: 407 UG/DL
TREPONEMA PALLIDUM IGG+IGM AB [PRESENCE] IN SERUM OR PLASMA BY IMMUNOASSAY: NONREACTIVE
UIBC SERPL-MCNC: 345 UG/DL
URATE SERPL-MCNC: 1.7 MG/DL (ref 2.3–6.7)
URINE PROTEIN, POC: NORMAL
UROBILINOGEN, POC: 0.2
WBC # BLD AUTO: 14 X10*3/UL (ref 4.4–11.3)

## 2024-03-11 PROCEDURE — 81002 URINALYSIS NONAUTO W/O SCOPE: CPT | Performed by: STUDENT IN AN ORGANIZED HEALTH CARE EDUCATION/TRAINING PROGRAM

## 2024-03-11 PROCEDURE — 59025 FETAL NON-STRESS TEST: CPT

## 2024-03-11 PROCEDURE — 82947 ASSAY GLUCOSE BLOOD QUANT: CPT | Mod: 59 | Performed by: STUDENT IN AN ORGANIZED HEALTH CARE EDUCATION/TRAINING PROGRAM

## 2024-03-11 PROCEDURE — 83615 LACTATE (LD) (LDH) ENZYME: CPT | Performed by: STUDENT IN AN ORGANIZED HEALTH CARE EDUCATION/TRAINING PROGRAM

## 2024-03-11 PROCEDURE — 84550 ASSAY OF BLOOD/URIC ACID: CPT | Performed by: STUDENT IN AN ORGANIZED HEALTH CARE EDUCATION/TRAINING PROGRAM

## 2024-03-11 PROCEDURE — 99215 OFFICE O/P EST HI 40 MIN: CPT | Mod: 25

## 2024-03-11 PROCEDURE — 83550 IRON BINDING TEST: CPT

## 2024-03-11 PROCEDURE — 86780 TREPONEMA PALLIDUM: CPT | Performed by: STUDENT IN AN ORGANIZED HEALTH CARE EDUCATION/TRAINING PROGRAM

## 2024-03-11 PROCEDURE — 85027 COMPLETE CBC AUTOMATED: CPT | Performed by: STUDENT IN AN ORGANIZED HEALTH CARE EDUCATION/TRAINING PROGRAM

## 2024-03-11 PROCEDURE — 82570 ASSAY OF URINE CREATININE: CPT | Performed by: STUDENT IN AN ORGANIZED HEALTH CARE EDUCATION/TRAINING PROGRAM

## 2024-03-11 PROCEDURE — 85045 AUTOMATED RETICULOCYTE COUNT: CPT

## 2024-03-11 PROCEDURE — 99213 OFFICE O/P EST LOW 20 MIN: CPT | Performed by: STUDENT IN AN ORGANIZED HEALTH CARE EDUCATION/TRAINING PROGRAM

## 2024-03-11 PROCEDURE — 99213 OFFICE O/P EST LOW 20 MIN: CPT | Mod: GC,TH | Performed by: OBSTETRICS & GYNECOLOGY

## 2024-03-11 PROCEDURE — 36415 COLL VENOUS BLD VENIPUNCTURE: CPT

## 2024-03-11 PROCEDURE — 36415 COLL VENOUS BLD VENIPUNCTURE: CPT | Performed by: STUDENT IN AN ORGANIZED HEALTH CARE EDUCATION/TRAINING PROGRAM

## 2024-03-11 PROCEDURE — 82746 ASSAY OF FOLIC ACID SERUM: CPT

## 2024-03-11 PROCEDURE — 82728 ASSAY OF FERRITIN: CPT

## 2024-03-11 PROCEDURE — 99214 OFFICE O/P EST MOD 30 MIN: CPT

## 2024-03-11 RX ORDER — ONDANSETRON HYDROCHLORIDE 2 MG/ML
4 INJECTION, SOLUTION INTRAVENOUS EVERY 6 HOURS PRN
Status: DISCONTINUED | OUTPATIENT
Start: 2024-03-11 | End: 2024-03-11 | Stop reason: HOSPADM

## 2024-03-11 RX ORDER — LABETALOL HYDROCHLORIDE 5 MG/ML
20 INJECTION, SOLUTION INTRAVENOUS ONCE AS NEEDED
Status: DISCONTINUED | OUTPATIENT
Start: 2024-03-11 | End: 2024-03-11 | Stop reason: HOSPADM

## 2024-03-11 RX ORDER — LIDOCAINE HYDROCHLORIDE 10 MG/ML
0.5 INJECTION INFILTRATION; PERINEURAL ONCE AS NEEDED
Status: DISCONTINUED | OUTPATIENT
Start: 2024-03-11 | End: 2024-03-11 | Stop reason: HOSPADM

## 2024-03-11 RX ORDER — HYDRALAZINE HYDROCHLORIDE 20 MG/ML
5 INJECTION INTRAMUSCULAR; INTRAVENOUS ONCE AS NEEDED
Status: DISCONTINUED | OUTPATIENT
Start: 2024-03-11 | End: 2024-03-11 | Stop reason: HOSPADM

## 2024-03-11 RX ORDER — NIFEDIPINE 10 MG/1
10 CAPSULE ORAL ONCE AS NEEDED
Status: DISCONTINUED | OUTPATIENT
Start: 2024-03-11 | End: 2024-03-11 | Stop reason: HOSPADM

## 2024-03-11 RX ORDER — ONDANSETRON 4 MG/1
4 TABLET, FILM COATED ORAL EVERY 6 HOURS PRN
Status: DISCONTINUED | OUTPATIENT
Start: 2024-03-11 | End: 2024-03-11 | Stop reason: HOSPADM

## 2024-03-11 SDOH — HEALTH STABILITY: MENTAL HEALTH: WISH TO BE DEAD (PAST 1 MONTH): NO

## 2024-03-11 SDOH — HEALTH STABILITY: MENTAL HEALTH: SUICIDAL BEHAVIOR (LIFETIME): NO

## 2024-03-11 SDOH — SOCIAL STABILITY: SOCIAL INSECURITY: ARE THERE ANY APPARENT SIGNS OF INJURIES/BEHAVIORS THAT COULD BE RELATED TO ABUSE/NEGLECT?: NO

## 2024-03-11 SDOH — SOCIAL STABILITY: SOCIAL INSECURITY: DO YOU FEEL ANYONE HAS EXPLOITED OR TAKEN ADVANTAGE OF YOU FINANCIALLY OR OF YOUR PERSONAL PROPERTY?: NO

## 2024-03-11 SDOH — SOCIAL STABILITY: SOCIAL INSECURITY: ARE YOU OR HAVE YOU BEEN THREATENED OR ABUSED PHYSICALLY, EMOTIONALLY, OR SEXUALLY BY ANYONE?: NO

## 2024-03-11 SDOH — SOCIAL STABILITY: SOCIAL INSECURITY: HAS ANYONE EVER THREATENED TO HURT YOUR FAMILY OR YOUR PETS?: NO

## 2024-03-11 SDOH — SOCIAL STABILITY: SOCIAL INSECURITY: ABUSE SCREEN: ADULT

## 2024-03-11 SDOH — SOCIAL STABILITY: SOCIAL INSECURITY: HAVE YOU HAD THOUGHTS OF HARMING ANYONE ELSE?: NO

## 2024-03-11 SDOH — HEALTH STABILITY: MENTAL HEALTH: NON-SPECIFIC ACTIVE SUICIDAL THOUGHTS (PAST 1 MONTH): NO

## 2024-03-11 SDOH — SOCIAL STABILITY: SOCIAL INSECURITY: PHYSICAL ABUSE: DENIES

## 2024-03-11 SDOH — SOCIAL STABILITY: SOCIAL INSECURITY: DOES ANYONE TRY TO KEEP YOU FROM HAVING/CONTACTING OTHER FRIENDS OR DOING THINGS OUTSIDE YOUR HOME?: NO

## 2024-03-11 SDOH — ECONOMIC STABILITY: HOUSING INSECURITY: DO YOU FEEL UNSAFE GOING BACK TO THE PLACE WHERE YOU ARE LIVING?: NO

## 2024-03-11 SDOH — HEALTH STABILITY: MENTAL HEALTH: WERE YOU ABLE TO COMPLETE ALL THE BEHAVIORAL HEALTH SCREENINGS?: YES

## 2024-03-11 SDOH — SOCIAL STABILITY: SOCIAL INSECURITY: VERBAL ABUSE: DENIES

## 2024-03-11 ASSESSMENT — LIFESTYLE VARIABLES
HOW OFTEN DO YOU HAVE 6 OR MORE DRINKS ON ONE OCCASION: NEVER
AUDIT-C TOTAL SCORE: 0
HOW MANY STANDARD DRINKS CONTAINING ALCOHOL DO YOU HAVE ON A TYPICAL DAY: PATIENT DOES NOT DRINK
HOW OFTEN DO YOU HAVE A DRINK CONTAINING ALCOHOL: NEVER
SKIP TO QUESTIONS 9-10: 1
AUDIT-C TOTAL SCORE: 0

## 2024-03-11 ASSESSMENT — PATIENT HEALTH QUESTIONNAIRE - PHQ9
1. LITTLE INTEREST OR PLEASURE IN DOING THINGS: NOT AT ALL
2. FEELING DOWN, DEPRESSED OR HOPELESS: NOT AT ALL
SUM OF ALL RESPONSES TO PHQ9 QUESTIONS 1 & 2: 0

## 2024-03-11 ASSESSMENT — PAIN SCALES - GENERAL
PAINLEVEL_OUTOF10: 0 - NO PAIN
PAINLEVEL_OUTOF10: 5 - MODERATE PAIN
PAINLEVEL_OUTOF10: 0 - NO PAIN

## 2024-03-11 NOTE — H&P
Obstetrical Admission History and Physical     Serafin Wall is a 33 y.o.  at 31w0d presents for hypertension in severe range.  Chief Complaint: Hypertension    Assessment/Plan    Chronic Hypertension  - One measure in severe range at 161/94 at prenatal appointment.  - On 10mg Amlodipine home dose (last dose this am)   - Was on Aspirin, but recently told to stop by her pharmacist  - Continue to cycle blood pressure, no additional measurements in severe range  - Continue amlodipine 10 mg  - BP normal to mild range in triage, no severe range BP  - Continue BID BP measurements  - S&S of PEC, BP parameters, and return precautions reviewed    Second Trimester Screening  - HELLP labs negative, n/f hgb 10--> anemia panel pending, will send message through [employer] with results  - 1 hr GTT pending  - Syphilis screen pending  - U/A negative for nitrite, Leuk est, blood, ketones    Maternal Well Being  - All questions and concerns addressed    Fetal Well Being  - FHR: 145, +acels, NST reactive  - GBS negative      Dispo  - Patient signed out to incoming Triage provider pending labs.  Likely discharge if blood pressures remain stable. Follow-up appointment scheduled for 3/25.    Patient staffed and discussed with Dr. Fransisco Boudreaux, DO  Emergency Medicine PGY1    Evening update: HELLP labs negative, asymptomatic. CBC n/f hgb 10-->Anemia panel pending. Discharged home.  Alannah Berg, KIZZY-CNP     Active Problems:  There are no active Hospital Problems.      Pregnancy Problems (from 23 to present)       Problem Noted Resolved    Maternal varicella, non-immune 2023 by BLANCHE Aguilera No    Priority:  Medium      Overview Signed 2023  9:02 AM by BLANCHE Aguilera       Offer varivax PP         Chronic hypertension during pregnancy 2023 by BLANCHE Aguilera No    Priority:  Medium      Overview Addendum 2023  5:34 PM by Renee ZENDEJAS  MD PATRICIA Varela only due to cHTN on medication  HELLP labs WNL at new OB p/c 0.11  ASA 81mg rx sent  Patient has BP cuff at home; discussed checking Bps 1-2x a day and calling with elevated Bps  23 Patient reports BP at home in 140s/80s. Amlodipine increased to 10 mg Daily.   Will plan for serial growth USNs, Fetal testing at 32 weeks         History of gestational hypertension 10/10/2023 by Vj Navarro No    Priority:  Medium      Overview Signed 2023  5:06 PM by BLANCHE Aguilera       With  pregnancy                  Subjective   Sanaa is here complaining of high blood pressure.  Hypertension symptoms:  none reported    Good fetal movement. Denies vaginal bleeding., Denies contractions., Denies leaking of fluid.      Patient is a 33 year old  female at 31w0d who presents to the [facility] for hypertension. Patient reports that she has chronic hypertension and came in today for a routine check and was sent to L&D when she had a blood pressure measurement in the severe range (161/94). She states that she is on 10mg amlodipine at home and was started on aspirin earlier in her pregnancy, but was told by her pharmacist to stop the aspirin a few weeks ago. Patient states that her BP normally runs around 140/80, but had been lower after taking the aspirin. Patient denies headache, shortness of breath, chest pain, nausea or vomiting, vision or hearing changes, abdominal change, abnormal vaginal discharge or bleeding, or edema.        Obstetrical History   OB History    Para Term  AB Living   4 2 1 1 1 2   SAB IAB Ectopic Multiple Live Births     1     2      # Outcome Date GA Lbr Harman/2nd Weight Sex Delivery Anes PTL Lv   4 Current            3 Term 22   2.155 kg F Vag-Spont   CRYSTAL      Complications: Gestational hypertension   2 IAB            1  12 35w0d  2.268 kg M Vag-Spont   CRYSTAL      Complications: Preeclampsia       Past Medical History  Past  Medical History:   Diagnosis Date    Hypertension     Trichomonas infection 07/15/2011    at [facility] in pap        Past Surgical History   Past Surgical History:   Procedure Laterality Date    CT ANGIO NECK  2023    CT NECK ANGIO W AND WO IV CONTRAST [facility] [place]    D&C FIRST TRIMESTER / TX INCOMPLETE / MISSED / SEPTIC / INDUCED          Social History  Social History     Tobacco Use    Smoking status: Former     Types: Cigarettes     Passive exposure: Never    Smokeless tobacco: Never   Substance Use Topics    Alcohol use: Not Currently     Substance and Sexual Activity   Drug Use Never       Allergies  Patient has no known allergies.     Medications  Medications Prior to Admission   Medication Sig Dispense Refill Last Dose    amLODIPine (Norvasc) 10 mg tablet TAKE 1 TABLET BY MOUTH EVERY DAY 90 tablet 1 3/11/2024    aspirin 81 mg EC tablet Take 2 tablets (162 mg) by mouth once daily. 60 tablet 11 Past Month    prenatal vitamin, iron-folic, 27 mg iron-800 mcg folic acid tablet Take 1 tablet by mouth once daily. 30 tablet 11 3/11/2024       Objective    Last Vitals  Temp Pulse Resp BP MAP O2 Sat   36.7 °C (98.1 °F) 79 18 (!) 142/75   100 %     Physical Examination  GENERAL: Examination reveals a well developed, well nourished, gravid female in no acute distress. She is alert and cooperative.  LUNGS: clear to auscultation bilaterally  HEART: regular rate and rhythm, S1, S2 normal, no murmur, click, rub or gallop  ABDOMEN: soft, gravid, nontender, nondistended, no abnormal masses, no epigastric pain  NEUROLOGICAL: alert, oriented, normal speech, no focal findings or movement disorder noted  PSYCHOLOGICAL: awake and alert; oriented to person, place, and time  FHR: 145, +acels, 2 variable decels, NST reactive    Lab Review  Labs in chart were reviewed.

## 2024-03-11 NOTE — PROGRESS NOTES
Subjective   Patient ID [id number]   Serafin Wall is a 33 y.o.  at 31w0d with a working estimated date of delivery of 2024, by Last Menstrual Period who presents for a routine prenatal visit. She denies vaginal bleeding, leakage of fluid, decreased fetal movements, or contractions.    Her pregnancy is complicated by:  cHTN, on amlodipine 10 mg daily. Took today. BP at home 130-140s/70s at home. Denies HA, vision changes, CP/SOB, RUQ pain.      Objective   Physical Exam:   Weight: 78.5 kg (173 lb)  Expected Total Weight Gain: 11.5 kg (25 lb)-16 kg (35 lb)   Pregravid BMI: 21.63  BP: (!) 161/94                  Lab Results   Component Value Date    HGB 11.6 (L) 2023    HCT 35.1 (L) 2023    ABO O 2023    HEPBSAG Nonreactive 2023       Assessment/Plan     cHTN, severe range BP today. Sent to L&D for triage evaluation. Will need 1 hour GTT and 2nd tri labs along with HELLP labs.     Discussed and seen with Dr. Avery Rodas MD

## 2024-03-12 DIAGNOSIS — O99.013 ANEMIA DURING PREGNANCY IN THIRD TRIMESTER (HHS-HCC): Primary | ICD-10-CM

## 2024-03-12 DIAGNOSIS — Z3A.31 31 WEEKS GESTATION OF PREGNANCY (HHS-HCC): ICD-10-CM

## 2024-03-12 RX ORDER — FERROUS SULFATE 325(65) MG
325 TABLET ORAL
Qty: 60 TABLET | Refills: 1 | Status: SHIPPED | OUTPATIENT
Start: 2024-03-12 | End: 2025-03-12

## 2024-03-12 RX ORDER — POLYETHYLENE GLYCOL 3350 17 G/17G
17 POWDER, FOR SOLUTION ORAL 2 TIMES DAILY PRN
Qty: 238 G | Refills: 1 | Status: SHIPPED | OUTPATIENT
Start: 2024-03-12 | End: 2024-04-24 | Stop reason: HOSPADM

## 2024-03-18 ENCOUNTER — HOSPITAL ENCOUNTER (OUTPATIENT)
Dept: RADIOLOGY | Facility: HOSPITAL | Age: 34
Discharge: HOME | End: 2024-03-18
Payer: COMMERCIAL

## 2024-03-18 DIAGNOSIS — O09.899 SUPERVISION OF OTHER HIGH RISK PREGNANCIES, UNSPECIFIED TRIMESTER (HHS-HCC): ICD-10-CM

## 2024-03-18 PROCEDURE — 76816 OB US FOLLOW-UP PER FETUS: CPT | Performed by: OBSTETRICS & GYNECOLOGY

## 2024-03-18 PROCEDURE — 76819 FETAL BIOPHYS PROFIL W/O NST: CPT

## 2024-03-18 PROCEDURE — 76816 OB US FOLLOW-UP PER FETUS: CPT

## 2024-03-18 PROCEDURE — 76819 FETAL BIOPHYS PROFIL W/O NST: CPT | Performed by: OBSTETRICS & GYNECOLOGY

## 2024-03-25 ENCOUNTER — HOSPITAL ENCOUNTER (OUTPATIENT)
Dept: RADIOLOGY | Facility: HOSPITAL | Age: 34
Discharge: HOME | End: 2024-03-25
Payer: COMMERCIAL

## 2024-03-25 ENCOUNTER — PROCEDURE VISIT (OUTPATIENT)
Dept: OBSTETRICS AND GYNECOLOGY | Facility: HOSPITAL | Age: 34
End: 2024-03-25
Payer: COMMERCIAL

## 2024-03-25 ENCOUNTER — ROUTINE PRENATAL (OUTPATIENT)
Dept: OBSTETRICS AND GYNECOLOGY | Facility: HOSPITAL | Age: 34
End: 2024-03-25
Payer: COMMERCIAL

## 2024-03-25 VITALS — SYSTOLIC BLOOD PRESSURE: 134 MMHG | DIASTOLIC BLOOD PRESSURE: 81 MMHG | BODY MASS INDEX: 29.12 KG/M2 | WEIGHT: 175 LBS

## 2024-03-25 DIAGNOSIS — Z23 NEED FOR TDAP VACCINATION: ICD-10-CM

## 2024-03-25 DIAGNOSIS — O99.013 ANEMIA DURING PREGNANCY IN THIRD TRIMESTER (HHS-HCC): ICD-10-CM

## 2024-03-25 DIAGNOSIS — O10.919 CHRONIC HYPERTENSION DURING PREGNANCY (HHS-HCC): ICD-10-CM

## 2024-03-25 DIAGNOSIS — Z3A.33 33 WEEKS GESTATION OF PREGNANCY (HHS-HCC): ICD-10-CM

## 2024-03-25 DIAGNOSIS — O10.919 CHRONIC HYPERTENSION DURING PREGNANCY (HHS-HCC): Primary | ICD-10-CM

## 2024-03-25 DIAGNOSIS — O13.3 TRANSIENT HYPERTENSION OF PREGNANCY IN THIRD TRIMESTER (HHS-HCC): ICD-10-CM

## 2024-03-25 PROCEDURE — 99214 OFFICE O/P EST MOD 30 MIN: CPT | Performed by: OBSTETRICS & GYNECOLOGY

## 2024-03-25 PROCEDURE — 59025 FETAL NON-STRESS TEST: CPT | Performed by: OBSTETRICS & GYNECOLOGY

## 2024-03-25 PROCEDURE — 99214 OFFICE O/P EST MOD 30 MIN: CPT | Mod: TH | Performed by: OBSTETRICS & GYNECOLOGY

## 2024-03-25 PROCEDURE — 90715 TDAP VACCINE 7 YRS/> IM: CPT | Performed by: OBSTETRICS & GYNECOLOGY

## 2024-03-25 PROCEDURE — 76819 FETAL BIOPHYS PROFIL W/O NST: CPT

## 2024-03-25 ASSESSMENT — ENCOUNTER SYMPTOMS
LOSS OF SENSATION IN FEET: 0
OCCASIONAL FEELINGS OF UNSTEADINESS: 0
DEPRESSION: 0

## 2024-03-25 NOTE — PROGRESS NOTES
Patient here for NST due to CHTN and hx of  delivery.  NST read by Renee Varela and results are non reactive  Dr. Varela ordered BPP for patient.  Salomón CONLEY

## 2024-03-25 NOTE — PROGRESS NOTES
Subjective   Patient ID [id number]   Serafin Wall is a 33 y.o.  at 33w0d with a working estimated date of delivery of 2024, by Last Menstrual Period who presents for a routine prenatal visit. She denies vaginal bleeding, leakage of fluid, decreased fetal movements, or contractions.    Her pregnancy is complicated by:  CHTN, on amlodipine  Hx  delivery  Anemia- on iron    Objective   Physical Exam  Weight: 79.4 kg (175 lb)  Expected Total Weight Gain: 11.5 kg (25 lb)-16 kg (35 lb)   Pregravid BMI: 21.63  BP: 134/81         Prenatal Labs  Urine dip:  Lab Results   Component Value Date    KETONESU Negative 2024    GLUCOSEUR NEGATIVE 2024    LEUKOCYTESUR TRACE 2024       Lab Results   Component Value Date    HGB 10.0 (L) 2024    HCT 30.4 (L) 2024    ABO O 2023    HEPBSAG Nonreactive 2023     Imaging  The most recent ultrasound was performed on 3/18/24 at 32.0 weeks, EFW 1752 gms 22%ile.        Assessment/Plan   Problem List Items Addressed This Visit             ICD-10-CM    Chronic hypertension during pregnancy - Primary O10.919     Other Visit Diagnoses         Codes    Anemia during pregnancy in third trimester     O99.013    33 weeks gestation of pregnancy     Z3A.33          Continue prenatal vitamin.  Labs reviewed.  GTT 99.  Tdap today  Follow up in 2 weeks for a routine prenatal visit.  Start weekly NSTs.   Recheck CBC next visit.

## 2024-03-25 NOTE — PROCEDURES
Serafin Wall, a  at 33w0d with an ALEXA of 2024, by Last Menstrual Period, was seen at [facility] for a nonstress test.    Non-Stress Test   Baseline Fetal Heart Rate for Non-Stress Test: 150 BPM  Variability in Waveform for Non-Stress Test: Moderate  Accelerations in Non-Stress Test: No  Decelerations in Non-Stress Test: None  Contractions in Non-Stress Test: Not present  Acoustic Stimulator for Non-Stress Test: Yes  Interpretation of Non-Stress Test   Interpretation of Non-Stress Test: (!) Non-reactive  Comments on Non-Stress Test: Patient sent for BPP

## 2024-04-02 ENCOUNTER — PROCEDURE VISIT (OUTPATIENT)
Dept: OBSTETRICS AND GYNECOLOGY | Facility: HOSPITAL | Age: 34
End: 2024-04-02
Payer: COMMERCIAL

## 2024-04-02 ENCOUNTER — HOSPITAL ENCOUNTER (OUTPATIENT)
Dept: RADIOLOGY | Facility: HOSPITAL | Age: 34
Discharge: HOME | End: 2024-04-02
Payer: COMMERCIAL

## 2024-04-02 VITALS — BODY MASS INDEX: 29.45 KG/M2 | SYSTOLIC BLOOD PRESSURE: 147 MMHG | DIASTOLIC BLOOD PRESSURE: 90 MMHG | WEIGHT: 177 LBS

## 2024-04-02 DIAGNOSIS — Z87.59 HISTORY OF GESTATIONAL HYPERTENSION: ICD-10-CM

## 2024-04-02 DIAGNOSIS — O28.8 NST (NON-STRESS TEST) NONREACTIVE: ICD-10-CM

## 2024-04-02 DIAGNOSIS — Z28.39 MATERNAL VARICELLA, NON-IMMUNE (HHS-HCC): ICD-10-CM

## 2024-04-02 DIAGNOSIS — O09.899 MATERNAL VARICELLA, NON-IMMUNE (HHS-HCC): ICD-10-CM

## 2024-04-02 DIAGNOSIS — O28.8 NST (NON-STRESS TEST) NONREACTIVE: Primary | ICD-10-CM

## 2024-04-02 DIAGNOSIS — O10.919 CHRONIC HYPERTENSION DURING PREGNANCY (HHS-HCC): ICD-10-CM

## 2024-04-02 PROCEDURE — 76819 FETAL BIOPHYS PROFIL W/O NST: CPT | Performed by: OBSTETRICS & GYNECOLOGY

## 2024-04-02 PROCEDURE — 76819 FETAL BIOPHYS PROFIL W/O NST: CPT

## 2024-04-02 NOTE — PROGRESS NOTES
This nurse handed off this patient for monitoring to Dena MATHUR RN and notified for initial BP reading of 151/95, recheck 147/90. Pt denies s/s of pre-eclampsia.

## 2024-04-03 ENCOUNTER — TELEPHONE (OUTPATIENT)
Dept: OBSTETRICS AND GYNECOLOGY | Facility: HOSPITAL | Age: 34
End: 2024-04-03
Payer: COMMERCIAL

## 2024-04-03 NOTE — TELEPHONE ENCOUNTER
Patient in office of NST with elevetaed BP.   Per Dr. Islas patient to get P:C.  Patient left without leaving urine sample.  Attempted to call patient to go to lab or come back to office to leave urine sample.   Will send [employer] message.

## 2024-04-04 LAB
CREAT UR-MCNC: 140.9 MG/DL (ref 20–320)
PROT UR-ACNC: 24 MG/DL (ref 5–24)
PROT/CREAT UR: 0.17 MG/MG CREAT (ref 0–0.17)

## 2024-04-04 PROCEDURE — 82570 ASSAY OF URINE CREATININE: CPT | Performed by: STUDENT IN AN ORGANIZED HEALTH CARE EDUCATION/TRAINING PROGRAM

## 2024-04-08 ENCOUNTER — APPOINTMENT (OUTPATIENT)
Dept: OBSTETRICS AND GYNECOLOGY | Facility: HOSPITAL | Age: 34
End: 2024-04-08
Payer: COMMERCIAL

## 2024-04-09 ENCOUNTER — LAB (OUTPATIENT)
Dept: LAB | Facility: LAB | Age: 34
End: 2024-04-09
Payer: COMMERCIAL

## 2024-04-09 ENCOUNTER — PROCEDURE VISIT (OUTPATIENT)
Dept: OBSTETRICS AND GYNECOLOGY | Facility: HOSPITAL | Age: 34
End: 2024-04-09
Payer: COMMERCIAL

## 2024-04-09 ENCOUNTER — ROUTINE PRENATAL (OUTPATIENT)
Dept: OBSTETRICS AND GYNECOLOGY | Facility: HOSPITAL | Age: 34
End: 2024-04-09
Payer: COMMERCIAL

## 2024-04-09 VITALS — WEIGHT: 177 LBS | DIASTOLIC BLOOD PRESSURE: 97 MMHG | SYSTOLIC BLOOD PRESSURE: 151 MMHG | BODY MASS INDEX: 29.45 KG/M2

## 2024-04-09 VITALS — DIASTOLIC BLOOD PRESSURE: 77 MMHG | SYSTOLIC BLOOD PRESSURE: 130 MMHG

## 2024-04-09 DIAGNOSIS — O10.919 CHRONIC HYPERTENSION DURING PREGNANCY (HHS-HCC): Primary | ICD-10-CM

## 2024-04-09 DIAGNOSIS — O99.013 ANEMIA DURING PREGNANCY IN THIRD TRIMESTER (HHS-HCC): ICD-10-CM

## 2024-04-09 DIAGNOSIS — Z87.59 HISTORY OF GESTATIONAL HYPERTENSION: ICD-10-CM

## 2024-04-09 DIAGNOSIS — O09.899 MATERNAL VARICELLA, NON-IMMUNE (HHS-HCC): ICD-10-CM

## 2024-04-09 DIAGNOSIS — O10.919 CHRONIC HYPERTENSION DURING PREGNANCY (HHS-HCC): ICD-10-CM

## 2024-04-09 DIAGNOSIS — Z28.39 MATERNAL VARICELLA, NON-IMMUNE (HHS-HCC): ICD-10-CM

## 2024-04-09 DIAGNOSIS — Z3A.35 35 WEEKS GESTATION OF PREGNANCY (HHS-HCC): ICD-10-CM

## 2024-04-09 LAB
ALBUMIN SERPL BCP-MCNC: 3.4 G/DL (ref 3.4–5)
ALP SERPL-CCNC: 149 U/L (ref 33–110)
ALT SERPL W P-5'-P-CCNC: 14 U/L (ref 7–45)
ANION GAP SERPL CALC-SCNC: 12 MMOL/L (ref 10–20)
AST SERPL W P-5'-P-CCNC: 17 U/L (ref 9–39)
BILIRUB SERPL-MCNC: 0.3 MG/DL (ref 0–1.2)
BUN SERPL-MCNC: 7 MG/DL (ref 6–23)
CALCIUM SERPL-MCNC: 9.4 MG/DL (ref 8.6–10.6)
CHLORIDE SERPL-SCNC: 104 MMOL/L (ref 98–107)
CO2 SERPL-SCNC: 23 MMOL/L (ref 21–32)
CREAT SERPL-MCNC: 0.48 MG/DL (ref 0.5–1.05)
EGFRCR SERPLBLD CKD-EPI 2021: >90 ML/MIN/1.73M*2
ERYTHROCYTE [DISTWIDTH] IN BLOOD BY AUTOMATED COUNT: 13.6 % (ref 11.5–14.5)
FERRITIN SERPL-MCNC: 36 NG/ML
FOLATE SERPL-MCNC: 17.9 NG/ML
GLUCOSE SERPL-MCNC: 101 MG/DL (ref 74–99)
HCT VFR BLD AUTO: 30.6 % (ref 36–46)
HGB BLD-MCNC: 10.4 G/DL (ref 12–16)
IRON SATN MFR SERPL: 21 %
IRON SERPL-MCNC: 94 UG/DL
MCH RBC QN AUTO: 31 PG (ref 26–34)
MCHC RBC AUTO-ENTMCNC: 34 G/DL (ref 32–36)
MCV RBC AUTO: 91 FL (ref 80–100)
NRBC BLD-RTO: 0 /100 WBCS (ref 0–0)
PLATELET # BLD AUTO: 355 X10*3/UL (ref 150–450)
POTASSIUM SERPL-SCNC: 4 MMOL/L (ref 3.5–5.3)
PROT SERPL-MCNC: 5.9 G/DL (ref 6.4–8.2)
RBC # BLD AUTO: 3.35 X10*6/UL (ref 4–5.2)
REFLEX ADDED, ANEMIA PANEL: NORMAL
SODIUM SERPL-SCNC: 135 MMOL/L (ref 136–145)
TIBC SERPL-MCNC: 438 UG/DL
UIBC SERPL-MCNC: 344 UG/DL
VIT B12 SERPL-MCNC: 288 PG/ML
WBC # BLD AUTO: 12.1 X10*3/UL (ref 4.4–11.3)

## 2024-04-09 PROCEDURE — 82570 ASSAY OF URINE CREATININE: CPT | Performed by: OBSTETRICS & GYNECOLOGY

## 2024-04-09 PROCEDURE — 59025 FETAL NON-STRESS TEST: CPT | Performed by: OBSTETRICS & GYNECOLOGY

## 2024-04-09 PROCEDURE — 99214 OFFICE O/P EST MOD 30 MIN: CPT | Mod: TH | Performed by: OBSTETRICS & GYNECOLOGY

## 2024-04-09 PROCEDURE — 82728 ASSAY OF FERRITIN: CPT

## 2024-04-09 PROCEDURE — 80053 COMPREHEN METABOLIC PANEL: CPT

## 2024-04-09 PROCEDURE — 99214 OFFICE O/P EST MOD 30 MIN: CPT | Performed by: OBSTETRICS & GYNECOLOGY

## 2024-04-09 PROCEDURE — 87081 CULTURE SCREEN ONLY: CPT | Performed by: OBSTETRICS & GYNECOLOGY

## 2024-04-09 PROCEDURE — 82607 VITAMIN B-12: CPT

## 2024-04-09 PROCEDURE — 85027 COMPLETE CBC AUTOMATED: CPT

## 2024-04-09 PROCEDURE — 36415 COLL VENOUS BLD VENIPUNCTURE: CPT

## 2024-04-09 PROCEDURE — 82746 ASSAY OF FOLIC ACID SERUM: CPT

## 2024-04-09 PROCEDURE — 83550 IRON BINDING TEST: CPT

## 2024-04-09 RX ORDER — LABETALOL 200 MG/1
200 TABLET, FILM COATED ORAL 2 TIMES DAILY
Qty: 60 TABLET | Refills: 1 | Status: SHIPPED | OUTPATIENT
Start: 2024-04-09 | End: 2024-05-01

## 2024-04-09 NOTE — PROCEDURES
Serafin Wall, a  at 35w1d with an ALEXA of 2024, by Last Menstrual Period, was seen at [facility] for a nonstress test.    Non-Stress Test   Baseline Fetal Heart Rate for Non-Stress Test: 145 BPM  Variability in Waveform for Non-Stress Test: Moderate  Accelerations in Non-Stress Test: Yes, greater than/equal to 15 bpm, lasting at least 15 seconds  Decelerations in Non-Stress Test: None  Contractions in Non-Stress Test: Not present  Acoustic Stimulator for Non-Stress Test: No  Interpretation of Non-Stress Test   Interpretation of Non-Stress Test: Reactive

## 2024-04-09 NOTE — PROGRESS NOTES
Subjective   Patient ID [id number]   Serafin Wall is a 33 y.o.  at 35w1d with a working estimated date of delivery of 2024, by Last Menstrual Period who presents for a routine prenatal visit. She denies vaginal bleeding, leakage of fluid, decreased fetal movements, or contractions.    Her pregnancy is complicated by:  Problem List Items Addressed This Visit       History of gestational hypertension    Overview       With  pregnancy          Maternal varicella, non-immune    Overview       Offer varivax PP         Chronic hypertension during pregnancy - Primary    Overview       MD only due to cHTN on medication  HELLP labs WNL at new OB p/c 0.11  ASA 81mg rx sent  Patient has BP cuff at home; discussed checking Bps 1-2x a day and calling with elevated Bps  23 Patient reports BP at home in 140s/80s. Amlodipine increased to 10 mg Daily.   Will plan for serial growth USNs, Fetal testing at 32 weeks  24- Multiple high mild range BP in office. Will start on Labetalol 200 mg BID, check labs.   Plan for IOL at 37 weeks.          Relevant Medications    labetalol (Normodyne) 200 mg tablet    Other Relevant Orders    CBC Anemia Panel With Reflex, Pregnancy    Comprehensive Metabolic Panel    Protein, Urine Random (P:C Ratio)    Group B Streptococcus (GBS) Prenatal Screen, Culture    Fetal nonstress test, once    Anemia during pregnancy in third trimester    Overview     Started on iron.           Other Visit Diagnoses       35 weeks gestation of pregnancy               Objective   Physical Exam:   Weight: 80.3 kg (177 lb)  Expected Total Weight Gain: 11.5 kg (25 lb)-16 kg (35 lb)   Pregravid BMI: 21.63  BP: (!) 151/97  Fetal Heart Rate: 147 Fundal Height (cm): 35 cm Presentation: Vertex  Dilation: Closed Effacement (%): 0 Fetal Station: -3    Prenatal Labs  Urine Dip:  Lab Results   Component Value Date    KETONESU Negative 2024    GLUCOSEUR NEGATIVE 2024    LEUKOCYTESUR TRACE 2024      Lab Results   Component Value Date    HGB 10.0 (L) 03/11/2024    HCT 30.4 (L) 03/11/2024    ABO O 11/01/2023    HEPBSAG Nonreactive 11/01/2023     Imaging  The most recent ultrasound was performed on 4/2/24 at 34 weeks for BPP 8/8  Growth at 32 weeks 22%ile. Follow-up growth next week.   NST today.     Assessment/Plan   Problem List Items Addressed This Visit             ICD-10-CM    History of gestational hypertension Z87.59    Maternal varicella, non-immune O09.899, Z28.39    Chronic hypertension during pregnancy - Primary O10.919    Relevant Medications    labetalol (Normodyne) 200 mg tablet    Other Relevant Orders    CBC Anemia Panel With Reflex, Pregnancy    Comprehensive Metabolic Panel    Protein, Urine Random (P:C Ratio)    Group B Streptococcus (GBS) Prenatal Screen, Culture    Fetal nonstress test, once    Anemia during pregnancy in third trimester O99.013     Other Visit Diagnoses         Codes    35 weeks gestation of pregnancy     Z3A.35          Continue prenatal vitamin.  Labs reviewed.  GBS taken.  Expected mode of delivery vaginal  Follow up in 1 week for a routine prenatal visit.  NST today.

## 2024-04-10 LAB
CREAT UR-MCNC: 107.7 MG/DL (ref 20–320)
PROT UR-ACNC: 21 MG/DL (ref 5–24)
PROT/CREAT UR: 0.19 MG/MG CREAT (ref 0–0.17)

## 2024-04-12 LAB — GP B STREP GENITAL QL CULT: NORMAL

## 2024-04-16 ENCOUNTER — APPOINTMENT (OUTPATIENT)
Dept: RADIOLOGY | Facility: HOSPITAL | Age: 34
End: 2024-04-16
Payer: COMMERCIAL

## 2024-04-16 ENCOUNTER — APPOINTMENT (OUTPATIENT)
Dept: OBSTETRICS AND GYNECOLOGY | Facility: HOSPITAL | Age: 34
End: 2024-04-16
Payer: COMMERCIAL

## 2024-04-17 ENCOUNTER — HOSPITAL ENCOUNTER (OUTPATIENT)
Dept: RADIOLOGY | Facility: HOSPITAL | Age: 34
Discharge: HOME | End: 2024-04-17
Payer: COMMERCIAL

## 2024-04-17 DIAGNOSIS — Z3A.33 33 WEEKS GESTATION OF PREGNANCY (HHS-HCC): ICD-10-CM

## 2024-04-17 DIAGNOSIS — O10.919 CHRONIC HYPERTENSION DURING PREGNANCY (HHS-HCC): ICD-10-CM

## 2024-04-17 PROCEDURE — 76818 FETAL BIOPHYS PROFILE W/NST: CPT

## 2024-04-22 ENCOUNTER — ANESTHESIA EVENT (OUTPATIENT)
Dept: OBSTETRICS AND GYNECOLOGY | Facility: HOSPITAL | Age: 34
End: 2024-04-22
Payer: COMMERCIAL

## 2024-04-22 ENCOUNTER — ANESTHESIA (OUTPATIENT)
Dept: OBSTETRICS AND GYNECOLOGY | Facility: HOSPITAL | Age: 34
End: 2024-04-22
Payer: COMMERCIAL

## 2024-04-22 ENCOUNTER — HOSPITAL ENCOUNTER (INPATIENT)
Facility: HOSPITAL | Age: 34
LOS: 2 days | Discharge: HOME | End: 2024-04-24
Attending: OBSTETRICS & GYNECOLOGY | Admitting: OBSTETRICS & GYNECOLOGY
Payer: COMMERCIAL

## 2024-04-22 ENCOUNTER — APPOINTMENT (OUTPATIENT)
Dept: OBSTETRICS AND GYNECOLOGY | Facility: HOSPITAL | Age: 34
End: 2024-04-22
Payer: COMMERCIAL

## 2024-04-22 DIAGNOSIS — O10.919 CHRONIC HYPERTENSION DURING PREGNANCY (HHS-HCC): ICD-10-CM

## 2024-04-22 DIAGNOSIS — Z30.017 NEXPLANON INSERTION: Primary | ICD-10-CM

## 2024-04-22 PROBLEM — Z34.90 ENCOUNTER FOR ELECTIVE INDUCTION OF LABOR (HHS-HCC): Status: ACTIVE | Noted: 2024-04-22

## 2024-04-22 PROBLEM — K21.9 GASTROESOPHAGEAL REFLUX DISEASE WITHOUT ESOPHAGITIS: Status: ACTIVE | Noted: 2024-04-22

## 2024-04-22 LAB
ABO GROUP (TYPE) IN BLOOD: NORMAL
ANTIBODY SCREEN: NORMAL
ERYTHROCYTE [DISTWIDTH] IN BLOOD BY AUTOMATED COUNT: 13.9 % (ref 11.5–14.5)
HCT VFR BLD AUTO: 32.5 % (ref 36–46)
HGB BLD-MCNC: 10.7 G/DL (ref 12–16)
MCH RBC QN AUTO: 31.2 PG (ref 26–34)
MCHC RBC AUTO-ENTMCNC: 32.9 G/DL (ref 32–36)
MCV RBC AUTO: 95 FL (ref 80–100)
NRBC BLD-RTO: 0.4 /100 WBCS (ref 0–0)
PLATELET # BLD AUTO: 323 X10*3/UL (ref 150–450)
RBC # BLD AUTO: 3.43 X10*6/UL (ref 4–5.2)
RH FACTOR (ANTIGEN D): NORMAL
TREPONEMA PALLIDUM IGG+IGM AB [PRESENCE] IN SERUM OR PLASMA BY IMMUNOASSAY: NONREACTIVE
WBC # BLD AUTO: 13.5 X10*3/UL (ref 4.4–11.3)

## 2024-04-22 PROCEDURE — 01967 NEURAXL LBR ANES VAG DLVR: CPT | Performed by: PAIN MEDICINE

## 2024-04-22 PROCEDURE — 86901 BLOOD TYPING SEROLOGIC RH(D): CPT | Performed by: STUDENT IN AN ORGANIZED HEALTH CARE EDUCATION/TRAINING PROGRAM

## 2024-04-22 PROCEDURE — 3E033VJ INTRODUCTION OF OTHER HORMONE INTO PERIPHERAL VEIN, PERCUTANEOUS APPROACH: ICD-10-PCS | Performed by: STUDENT IN AN ORGANIZED HEALTH CARE EDUCATION/TRAINING PROGRAM

## 2024-04-22 PROCEDURE — 86780 TREPONEMA PALLIDUM: CPT | Performed by: STUDENT IN AN ORGANIZED HEALTH CARE EDUCATION/TRAINING PROGRAM

## 2024-04-22 PROCEDURE — 51701 INSERT BLADDER CATHETER: CPT

## 2024-04-22 PROCEDURE — 10907ZC DRAINAGE OF AMNIOTIC FLUID, THERAPEUTIC FROM PRODUCTS OF CONCEPTION, VIA NATURAL OR ARTIFICIAL OPENING: ICD-10-PCS

## 2024-04-22 PROCEDURE — 2500000001 HC RX 250 WO HCPCS SELF ADMINISTERED DRUGS (ALT 637 FOR MEDICARE OP): Performed by: STUDENT IN AN ORGANIZED HEALTH CARE EDUCATION/TRAINING PROGRAM

## 2024-04-22 PROCEDURE — 7210000002 HC LABOR PER HOUR

## 2024-04-22 PROCEDURE — 36415 COLL VENOUS BLD VENIPUNCTURE: CPT | Performed by: STUDENT IN AN ORGANIZED HEALTH CARE EDUCATION/TRAINING PROGRAM

## 2024-04-22 PROCEDURE — 1120000001 HC OB PRIVATE ROOM DAILY

## 2024-04-22 PROCEDURE — 85027 COMPLETE CBC AUTOMATED: CPT | Performed by: STUDENT IN AN ORGANIZED HEALTH CARE EDUCATION/TRAINING PROGRAM

## 2024-04-22 PROCEDURE — 86920 COMPATIBILITY TEST SPIN: CPT

## 2024-04-22 PROCEDURE — 2500000004 HC RX 250 GENERAL PHARMACY W/ HCPCS (ALT 636 FOR OP/ED): Performed by: STUDENT IN AN ORGANIZED HEALTH CARE EDUCATION/TRAINING PROGRAM

## 2024-04-22 PROCEDURE — 2500000004 HC RX 250 GENERAL PHARMACY W/ HCPCS (ALT 636 FOR OP/ED)

## 2024-04-22 PROCEDURE — 01967 NEURAXL LBR ANES VAG DLVR: CPT | Performed by: NURSE ANESTHETIST, CERTIFIED REGISTERED

## 2024-04-22 PROCEDURE — 2500000005 HC RX 250 GENERAL PHARMACY W/O HCPCS: Performed by: NURSE ANESTHETIST, CERTIFIED REGISTERED

## 2024-04-22 RX ORDER — ONDANSETRON 4 MG/1
4 TABLET, FILM COATED ORAL EVERY 6 HOURS PRN
Status: DISCONTINUED | OUTPATIENT
Start: 2024-04-22 | End: 2024-04-23

## 2024-04-22 RX ORDER — OXYTOCIN/0.9 % SODIUM CHLORIDE 30/500 ML
2-30 PLASTIC BAG, INJECTION (ML) INTRAVENOUS CONTINUOUS
Status: DISCONTINUED | OUTPATIENT
Start: 2024-04-22 | End: 2024-04-23

## 2024-04-22 RX ORDER — OXYTOCIN 10 [USP'U]/ML
10 INJECTION, SOLUTION INTRAMUSCULAR; INTRAVENOUS ONCE AS NEEDED
Status: DISCONTINUED | OUTPATIENT
Start: 2024-04-22 | End: 2024-04-23 | Stop reason: HOSPADM

## 2024-04-22 RX ORDER — METOCLOPRAMIDE 10 MG/1
10 TABLET ORAL EVERY 6 HOURS PRN
Status: DISCONTINUED | OUTPATIENT
Start: 2024-04-22 | End: 2024-04-23

## 2024-04-22 RX ORDER — FENTANYL/BUPIVACAINE/NS/PF 2MCG/ML-.1
PLASTIC BAG, INJECTION (ML) INJECTION AS NEEDED
Status: DISCONTINUED | OUTPATIENT
Start: 2024-04-22 | End: 2024-04-23

## 2024-04-22 RX ORDER — LOPERAMIDE HYDROCHLORIDE 2 MG/1
4 CAPSULE ORAL EVERY 2 HOUR PRN
Status: DISCONTINUED | OUTPATIENT
Start: 2024-04-22 | End: 2024-04-23 | Stop reason: HOSPADM

## 2024-04-22 RX ORDER — OXYTOCIN/0.9 % SODIUM CHLORIDE 30/500 ML
60 PLASTIC BAG, INJECTION (ML) INTRAVENOUS ONCE AS NEEDED
Status: DISCONTINUED | OUTPATIENT
Start: 2024-04-22 | End: 2024-04-23 | Stop reason: HOSPADM

## 2024-04-22 RX ORDER — LIDOCAINE HYDROCHLORIDE 10 MG/ML
30 INJECTION INFILTRATION; PERINEURAL ONCE AS NEEDED
Status: DISCONTINUED | OUTPATIENT
Start: 2024-04-22 | End: 2024-04-23 | Stop reason: HOSPADM

## 2024-04-22 RX ORDER — LABETALOL HYDROCHLORIDE 5 MG/ML
20 INJECTION, SOLUTION INTRAVENOUS ONCE AS NEEDED
Status: DISCONTINUED | OUTPATIENT
Start: 2024-04-22 | End: 2024-04-23 | Stop reason: HOSPADM

## 2024-04-22 RX ORDER — METOCLOPRAMIDE HYDROCHLORIDE 5 MG/ML
10 INJECTION INTRAMUSCULAR; INTRAVENOUS EVERY 6 HOURS PRN
Status: DISCONTINUED | OUTPATIENT
Start: 2024-04-22 | End: 2024-04-23

## 2024-04-22 RX ORDER — NIFEDIPINE 10 MG/1
10 CAPSULE ORAL ONCE AS NEEDED
Status: DISCONTINUED | OUTPATIENT
Start: 2024-04-22 | End: 2024-04-23 | Stop reason: HOSPADM

## 2024-04-22 RX ORDER — CARBOPROST TROMETHAMINE 250 UG/ML
250 INJECTION, SOLUTION INTRAMUSCULAR ONCE AS NEEDED
Status: DISCONTINUED | OUTPATIENT
Start: 2024-04-22 | End: 2024-04-23 | Stop reason: HOSPADM

## 2024-04-22 RX ORDER — METHYLERGONOVINE MALEATE 0.2 MG/ML
0.2 INJECTION INTRAVENOUS ONCE AS NEEDED
Status: DISCONTINUED | OUTPATIENT
Start: 2024-04-22 | End: 2024-04-23 | Stop reason: HOSPADM

## 2024-04-22 RX ORDER — LABETALOL 100 MG/1
200 TABLET, FILM COATED ORAL 2 TIMES DAILY
Status: DISCONTINUED | OUTPATIENT
Start: 2024-04-22 | End: 2024-04-24 | Stop reason: HOSPADM

## 2024-04-22 RX ORDER — TRANEXAMIC ACID 100 MG/ML
1000 INJECTION, SOLUTION INTRAVENOUS ONCE AS NEEDED
Status: DISCONTINUED | OUTPATIENT
Start: 2024-04-22 | End: 2024-04-23 | Stop reason: HOSPADM

## 2024-04-22 RX ORDER — HYDRALAZINE HYDROCHLORIDE 20 MG/ML
5 INJECTION INTRAMUSCULAR; INTRAVENOUS ONCE AS NEEDED
Status: DISCONTINUED | OUTPATIENT
Start: 2024-04-22 | End: 2024-04-23 | Stop reason: HOSPADM

## 2024-04-22 RX ORDER — FENTANYL CITRATE 50 UG/ML
50 INJECTION, SOLUTION INTRAMUSCULAR; INTRAVENOUS ONCE
Status: COMPLETED | OUTPATIENT
Start: 2024-04-22 | End: 2024-04-22

## 2024-04-22 RX ORDER — AMLODIPINE BESYLATE 10 MG/1
10 TABLET ORAL DAILY
Status: DISCONTINUED | OUTPATIENT
Start: 2024-04-22 | End: 2024-04-24 | Stop reason: HOSPADM

## 2024-04-22 RX ORDER — MISOPROSTOL 200 UG/1
800 TABLET ORAL ONCE AS NEEDED
Status: DISCONTINUED | OUTPATIENT
Start: 2024-04-22 | End: 2024-04-23 | Stop reason: HOSPADM

## 2024-04-22 RX ORDER — ONDANSETRON HYDROCHLORIDE 2 MG/ML
4 INJECTION, SOLUTION INTRAVENOUS EVERY 6 HOURS PRN
Status: DISCONTINUED | OUTPATIENT
Start: 2024-04-22 | End: 2024-04-23

## 2024-04-22 RX ORDER — TERBUTALINE SULFATE 1 MG/ML
0.25 INJECTION SUBCUTANEOUS ONCE AS NEEDED
Status: DISCONTINUED | OUTPATIENT
Start: 2024-04-22 | End: 2024-04-23 | Stop reason: HOSPADM

## 2024-04-22 RX ORDER — SODIUM CHLORIDE, SODIUM LACTATE, POTASSIUM CHLORIDE, CALCIUM CHLORIDE 600; 310; 30; 20 MG/100ML; MG/100ML; MG/100ML; MG/100ML
125 INJECTION, SOLUTION INTRAVENOUS CONTINUOUS
Status: DISCONTINUED | OUTPATIENT
Start: 2024-04-22 | End: 2024-04-23

## 2024-04-22 RX ADMIN — Medication 2 MILLI-UNITS/MIN: at 11:58

## 2024-04-22 RX ADMIN — Medication 14 ML: at 19:02

## 2024-04-22 RX ADMIN — FENTANYL CITRATE 50 MCG: 50 INJECTION, SOLUTION INTRAMUSCULAR; INTRAVENOUS at 10:15

## 2024-04-22 RX ADMIN — Medication 5 ML: at 18:58

## 2024-04-22 RX ADMIN — SODIUM CHLORIDE, POTASSIUM CHLORIDE, SODIUM LACTATE AND CALCIUM CHLORIDE 125 ML/HR: 600; 310; 30; 20 INJECTION, SOLUTION INTRAVENOUS at 23:41

## 2024-04-22 RX ADMIN — AMLODIPINE BESYLATE 10 MG: 10 TABLET ORAL at 18:05

## 2024-04-22 RX ADMIN — SODIUM CHLORIDE, POTASSIUM CHLORIDE, SODIUM LACTATE AND CALCIUM CHLORIDE 125 ML/HR: 600; 310; 30; 20 INJECTION, SOLUTION INTRAVENOUS at 08:50

## 2024-04-22 RX ADMIN — Medication 5 ML: at 18:55

## 2024-04-22 RX ADMIN — LABETALOL HYDROCHLORIDE 200 MG: 100 TABLET, FILM COATED ORAL at 21:14

## 2024-04-22 SDOH — HEALTH STABILITY: MENTAL HEALTH
STRESS IS WHEN SOMEONE FEELS TENSE, NERVOUS, ANXIOUS, OR CAN'T SLEEP AT NIGHT BECAUSE THEIR MIND IS TROUBLED. HOW STRESSED ARE YOU?: NOT AT ALL

## 2024-04-22 SDOH — SOCIAL STABILITY: SOCIAL INSECURITY: WITHIN THE LAST YEAR, HAVE YOU BEEN HUMILIATED OR EMOTIONALLY ABUSED IN OTHER WAYS BY YOUR PARTNER OR EX-PARTNER?: NO

## 2024-04-22 SDOH — HEALTH STABILITY: MENTAL HEALTH
HOW OFTEN DO YOU NEED TO HAVE SOMEONE HELP YOU WHEN YOU READ INSTRUCTIONS, PAMPHLETS, OR OTHER WRITTEN MATERIAL FROM YOUR DOCTOR OR PHARMACY?: NEVER

## 2024-04-22 SDOH — HEALTH STABILITY: MENTAL HEALTH: HAVE YOU USED ANY PRESCRIPTION DRUGS OTHER THAN PRESCRIBED IN THE PAST 12 MONTHS?: NO

## 2024-04-22 SDOH — HEALTH STABILITY: MENTAL HEALTH: NON-SPECIFIC ACTIVE SUICIDAL THOUGHTS (PAST 1 MONTH): NO

## 2024-04-22 SDOH — HEALTH STABILITY: MENTAL HEALTH: CURRENT SMOKER: 0

## 2024-04-22 SDOH — HEALTH STABILITY: MENTAL HEALTH: SUICIDAL BEHAVIOR (LIFETIME): NO

## 2024-04-22 SDOH — HEALTH STABILITY: MENTAL HEALTH: WISH TO BE DEAD (PAST 1 MONTH): NO

## 2024-04-22 SDOH — ECONOMIC STABILITY: HOUSING INSECURITY: DO YOU FEEL UNSAFE GOING BACK TO THE PLACE WHERE YOU ARE LIVING?: NO

## 2024-04-22 SDOH — SOCIAL STABILITY: SOCIAL INSECURITY
WITHIN THE LAST YEAR, HAVE YOU BEEN KICKED, HIT, SLAPPED, OR OTHERWISE PHYSICALLY HURT BY YOUR PARTNER OR EX-PARTNER?: NO

## 2024-04-22 SDOH — SOCIAL STABILITY: SOCIAL NETWORK: HOW OFTEN DO YOU ATTEND CHURCH OR RELIGIOUS SERVICES?: NEVER

## 2024-04-22 SDOH — SOCIAL STABILITY: SOCIAL INSECURITY: HAS ANYONE EVER THREATENED TO HURT YOUR FAMILY OR YOUR PETS?: NO

## 2024-04-22 SDOH — SOCIAL STABILITY: SOCIAL NETWORK: ARE YOU MARRIED, WIDOWED, DIVORCED, SEPARATED, NEVER MARRIED, OR LIVING WITH A PARTNER?: LIVING WITH PARTNER

## 2024-04-22 SDOH — SOCIAL STABILITY: SOCIAL INSECURITY: ARE THERE ANY APPARENT SIGNS OF INJURIES/BEHAVIORS THAT COULD BE RELATED TO ABUSE/NEGLECT?: NO

## 2024-04-22 SDOH — HEALTH STABILITY: MENTAL HEALTH: WERE YOU ABLE TO COMPLETE ALL THE BEHAVIORAL HEALTH SCREENINGS?: YES

## 2024-04-22 SDOH — SOCIAL STABILITY: SOCIAL NETWORK
DO YOU BELONG TO ANY CLUBS OR ORGANIZATIONS SUCH AS CHURCH GROUPS UNIONS, FRATERNAL OR ATHLETIC GROUPS, OR SCHOOL GROUPS?: NO

## 2024-04-22 SDOH — SOCIAL STABILITY: SOCIAL INSECURITY: HAVE YOU HAD THOUGHTS OF HARMING ANYONE ELSE?: YES

## 2024-04-22 SDOH — SOCIAL STABILITY: SOCIAL INSECURITY: VERBAL ABUSE: DENIES

## 2024-04-22 SDOH — ECONOMIC STABILITY: FOOD INSECURITY: WITHIN THE PAST 12 MONTHS, THE FOOD YOU BOUGHT JUST DIDN'T LAST AND YOU DIDN'T HAVE MONEY TO GET MORE.: NEVER TRUE

## 2024-04-22 SDOH — SOCIAL STABILITY: SOCIAL INSECURITY: ABUSE SCREEN: ADULT

## 2024-04-22 SDOH — SOCIAL STABILITY: SOCIAL INSECURITY: DOES ANYONE TRY TO KEEP YOU FROM HAVING/CONTACTING OTHER FRIENDS OR DOING THINGS OUTSIDE YOUR HOME?: NO

## 2024-04-22 SDOH — SOCIAL STABILITY: SOCIAL INSECURITY: WITHIN THE LAST YEAR, HAVE YOU BEEN AFRAID OF YOUR PARTNER OR EX-PARTNER?: NO

## 2024-04-22 SDOH — SOCIAL STABILITY: SOCIAL INSECURITY: PHYSICAL ABUSE: DENIES

## 2024-04-22 SDOH — ECONOMIC STABILITY: FOOD INSECURITY: WITHIN THE PAST 12 MONTHS, YOU WORRIED THAT YOUR FOOD WOULD RUN OUT BEFORE YOU GOT MONEY TO BUY MORE.: NEVER TRUE

## 2024-04-22 SDOH — SOCIAL STABILITY: SOCIAL INSECURITY: HAVE YOU HAD ANY THOUGHTS OF HARMING ANYONE ELSE?: NO

## 2024-04-22 SDOH — SOCIAL STABILITY: SOCIAL INSECURITY: ARE YOU OR HAVE YOU BEEN THREATENED OR ABUSED PHYSICALLY, EMOTIONALLY, OR SEXUALLY BY ANYONE?: NO

## 2024-04-22 SDOH — SOCIAL STABILITY: SOCIAL NETWORK: HOW OFTEN DO YOU GET TOGETHER WITH FRIENDS OR RELATIVES?: TWICE A WEEK

## 2024-04-22 SDOH — SOCIAL STABILITY: SOCIAL INSECURITY
WITHIN THE LAST YEAR, HAVE TO BEEN RAPED OR FORCED TO HAVE ANY KIND OF SEXUAL ACTIVITY BY YOUR PARTNER OR EX-PARTNER?: NO

## 2024-04-22 SDOH — ECONOMIC STABILITY: INCOME INSECURITY: HOW HARD IS IT FOR YOU TO PAY FOR THE VERY BASICS LIKE FOOD, HOUSING, MEDICAL CARE, AND HEATING?: NOT HARD AT ALL

## 2024-04-22 SDOH — SOCIAL STABILITY: SOCIAL INSECURITY: DO YOU FEEL ANYONE HAS EXPLOITED OR TAKEN ADVANTAGE OF YOU FINANCIALLY OR OF YOUR PERSONAL PROPERTY?: NO

## 2024-04-22 SDOH — SOCIAL STABILITY: SOCIAL NETWORK
IN A TYPICAL WEEK, HOW MANY TIMES DO YOU TALK ON THE PHONE WITH FAMILY, FRIENDS, OR NEIGHBORS?: MORE THAN THREE TIMES A WEEK

## 2024-04-22 SDOH — HEALTH STABILITY: PHYSICAL HEALTH: ON AVERAGE, HOW MANY DAYS PER WEEK DO YOU ENGAGE IN MODERATE TO STRENUOUS EXERCISE (LIKE A BRISK WALK)?: 7 DAYS

## 2024-04-22 SDOH — SOCIAL STABILITY: SOCIAL NETWORK: HOW OFTEN DO YOU ATTENT MEETINGS OF THE CLUB OR ORGANIZATION YOU BELONG TO?: NEVER

## 2024-04-22 SDOH — HEALTH STABILITY: PHYSICAL HEALTH: ON AVERAGE, HOW MANY MINUTES DO YOU ENGAGE IN EXERCISE AT THIS LEVEL?: 20 MIN

## 2024-04-22 SDOH — HEALTH STABILITY: MENTAL HEALTH: HAVE YOU USED ANY SUBSTANCES (CANABIS, COCAINE, HEROIN, HALLUCINOGENS, INHALANTS, ETC.) IN THE PAST 12 MONTHS?: NO

## 2024-04-22 ASSESSMENT — LIFESTYLE VARIABLES
AUDIT-C TOTAL SCORE: 0
HOW MANY STANDARD DRINKS CONTAINING ALCOHOL DO YOU HAVE ON A TYPICAL DAY: PATIENT DOES NOT DRINK
HOW OFTEN DO YOU HAVE 6 OR MORE DRINKS ON ONE OCCASION: NEVER
SKIP TO QUESTIONS 9-10: 1
HOW OFTEN DO YOU HAVE A DRINK CONTAINING ALCOHOL: NEVER
AUDIT-C TOTAL SCORE: 0

## 2024-04-22 ASSESSMENT — PAIN SCALES - GENERAL
PAINLEVEL_OUTOF10: 0 - NO PAIN
PAINLEVEL_OUTOF10: 0 - NO PAIN
PAINLEVEL_OUTOF10: 5 - MODERATE PAIN
PAINLEVEL_OUTOF10: 0 - NO PAIN

## 2024-04-22 ASSESSMENT — ACTIVITIES OF DAILY LIVING (ADL): LACK_OF_TRANSPORTATION: NO

## 2024-04-22 NOTE — ANESTHESIA PROCEDURE NOTES
Epidural Block    Patient location during procedure: OB  Start time: 4/22/2024 6:41 PM  End time: 4/22/2024 6:54 PM  Reason for block: labor analgesia  Staffing  Performed: [facility]   Authorized by: Sergio Langston MD    Performed by: ALEXANDER Calero    Preanesthetic Checklist  Completed: patient identified, IV checked, risks and benefits discussed, surgical consent, monitors and equipment checked, pre-op evaluation, timeout performed and sterile techniques followed  Block Timeout  RN/Licensed healthcare professional reads aloud to the Anesthesia provider and entire team: Patient identity, procedure with side and site, patient position, and as applicable the availability of implants/special equipment/special requirements.  Patient on coagulant treatment: no  Timeout performed at: 4/22/2024 6:41 PM  Block Placement  Patient position: sitting  Prep: ChloraPrep  Sterility prep: hand, mask, gloves, drape and cap  Sedation level: no sedation  Patient monitoring: blood pressure, continuous pulse oximetry and heart rate  Approach: midline  Local numbing: lidocaine 1% to skin and subcutaneous tissues  Vertebral space: lumbar  Lumbar location: L3-L4  Epidural  Loss of resistance technique: saline  Guidance: landmark technique        Needle  Needle type: Tuohy   Needle gauge: 17  Needle length: 8.9cm  Needle insertion depth: 5 cm  Catheter type: multi-orifice  Catheter size: 19 G  Catheter at skin depth: 10 cm  Catheter securement method: clear occlusive dressing    Test dose: lidocaine 1.5% with epinephrine 1-to-200,000  Test dose: lidocaine 1.5% with epinephrine 1-to-200,000  Test dose result: no positive test dose    PCEA  Medication concentration used: 0.044% Bupivacaine with 1.25 mcg/mL Fentanyl and [id number] Epinephrine  Dose (mL): 10  Lockout (minutes): 15  1-Hour Limit (boluses/hr): 4  Basal Rate: 14        Assessment  Block outcome: patient comfortable  Number of attempts: 1  Events: no positive test  dose  Procedure assessment: patient tolerated procedure well with no immediate complications  Additional Notes  Epidural risks/benefits discussed with pt, no questions at this time.  X1 attempt by SRNA. Negative aspiration for CSF and blood. Negative test dose.

## 2024-04-22 NOTE — CARE PLAN
"The patient's goals for the shift include \"To get admitted and get process started with adequate comfort.\" Patient Goal met: Scheduled IOL initiated and in progress. Fentanyl effective for management of potential discomfort during CRB placement. Coping adequately in early labor on Pitocin.    The clinical goals for the shift include Maintain reausrring fetal respone to labor. Maintain BP's<160/110. IOL in progress with FHT's category 1. BP's mild range with no s/sx PEC      Problem: Vaginal Birth or  Section  Goal: Fetal and maternal status remain reassuring during the birth process  Outcome: Progressing  Goal: Prevention of malpresentation/labor dystocia through delivery  Outcome: Progressing  Goal: Demonstrates labor coping techniques through delivery  2024 1448 by Emerita Deluca RN  Outcome: Progressing  2024 1448 by Emerita Deluca RN  Outcome: Progressing  Goal: Minimal s/sx of HDP and BP<160/110  Outcome: Progressing  Goal: Tolerate CRB for IOL placement maintenance until dislodgement/removal 12hrs after placement  Outcome: Progressing     Problem: Hypertensive Disorder of Pregnancy (HDP)  Goal: Minimal s/sx of HDP and BP<160/110  Outcome: Progressing  Goal: Adequate urine output (0.5 ml/kg/hr)  Outcome: Progressing     Problem: Safety - Adult  Goal: Free from fall injury  Outcome: Progressing     Problem: Fall/Injury  Goal: Not fall by end of shift  Outcome: Met  Goal: Be free from injury by end of the shift  Outcome: Met  Goal: Verbalize understanding of personal risk factors for fall in the hospital  Outcome: Met     34 y/o  at 37.0 wks undergoing scheduled IOL in setting of cHTN.  FHT's reasurring with category 1 tracing. CRB placed with IV Fentanyl to avoid discomfort during procedure; CRB remains in place as Pitocin initiated and in progress after pt ate breakfast. Contraction pattern beginning to improve. BP's mild range ; no s/sx PEC present. Pt coping well in early/latent " labor.

## 2024-04-22 NOTE — PROGRESS NOTES
Intrapartum Progress Note    Assessment/Plan   Serafin Wall is a 33 y.o.  at 37w0d c/w 19wk US who presents for IOL in s/o cHTN.    IOL  - Latent labor  - S/p ripening with CRB  - Will AROM after epidural placement  - Type and screen, CBC on admission     cHTN  - High mild range BPs  - Continue Labetalol 200 mg BID, will restart amlodipine 10 mg  - Baseline PEC labs wnl  - Last growth  36.2 EFW 2522 g 18% AC 15%     Anemia  - T+C 1u on admission  - On PO iron  - Admission hgb 10.7     Maternal well-being  - Epidural per patient request  - GBS neg     Fetal well-being  - CEFM; Cat 1 currently     Postpartum care  - Contraception: depo vs nexplanon    Seen and discussed w/ Dr. Ayush Caceres MD PGY-1  Obstetrics & Gynecology      Principal Problem:    Encounter for elective induction of labor ([facility])  Active Problems:    Gastroesophageal reflux disease without esophagitis    Pregnancy Problems (from 23 to present)       Problem Noted Resolved    Encounter for elective induction of labor ([facility]) 2024 by Tehresa Heck MD No    Priority:  Medium      Anemia during pregnancy in third trimester ([facility]) 2024 by Renee Varela MD No    Priority:  Medium      Overview Signed 2024  9:39 AM by Renee Varela MD     Started on iron.          Maternal varicella, non-immune ([facility]) 2023 by BLANCHE Aguilera No    Priority:  Medium      Overview Signed 2023  9:02 AM by BLANCHE Aguilera       Offer varivax PP         Chronic hypertension during pregnancy ([facility]) 2023 by BLANCHE Aguilera No    Priority:  Medium      Overview Addendum 2024  9:40 AM by Renee Varela MD MD only due to cHTN on medication  HELLP labs WNL at new OB p/c 0.11  ASA 81mg rx sent  Patient has BP cuff at home; discussed checking Bps 1-2x a day and calling with elevated Bps  23 Patient reports BP at home in 140s/80s.  Amlodipine increased to 10 mg Daily.   Will plan for serial growth USNs, Fetal testing at 32 weeks  4/9/24- Multiple high mild range BP in office. Will start on Labetalol 200 mg BID, check labs.   Plan for IOL at 37 weeks.          History of gestational hypertension 10/10/2023 by Vj Navarro No    Priority:  Medium      Overview Signed 11/1/2023  5:06 PM by BLANCHE Aguilera       With 2022 pregnancy                  Subjective   Patient wanting epidural prior to assessment for AROM.    Objective   Last Vitals:  Temp Pulse Resp BP MAP Pulse Ox   36.6 °C (97.9 °F) 95 18 (!) 159/95   98 %     Vitals Min/Max Last 24 Hours:  Temp  Min: 36.5 °C (97.7 °F)  Max: 36.9 °C (98.4 °F)  Pulse  Min: 73  Max: 95  Resp  Min: 18  Max: 28  BP  Min: 134/91  Max: 159/95    Intake/Output:    Intake/Output Summary (Last 24 hours) at 4/22/2024 1825  Last data filed at 4/22/2024 1500  Gross per 24 hour   Intake 779 ml   Output 1375 ml   Net -596 ml       Physical Examination:  GENERAL: Examination reveals a well developed, well nourished, gravid female in no acute distress. She is alert and cooperative.  HEENT: External ears normal. Nose normal, no erythema or discharge. Mouth and throat clear.  NECK: supple, no significant adenopathy  LUNGS: Normal respiratory effort  HEART: RRR  ABDOMEN: Gravid  EXTREMITIES: no limitation in range of motion  SKIN: normal coloration and turgor, no rashes  NEUROLOGICAL: alert, oriented, normal speech, no focal findings or movement disorder noted  PSYCHOLOGICAL: awake and alert; oriented to person, place, and time      Lab Review:  Lab Results   Component Value Date    ABO O 04/22/2024    LABRH POS 04/22/2024    ABSCRN NEG 04/22/2024     Lab Results   Component Value Date    WBC 13.5 (H) 04/22/2024    HGB 10.7 (L) 04/22/2024    HCT 32.5 (L) 04/22/2024     04/22/2024

## 2024-04-22 NOTE — SIGNIFICANT EVENT
"Labor Progress Note    Subjective: Feeling ctx frequently, overall comfortable    Objective:  Vitals: BP (!) 135/92   Pulse 83   Temp 36.9 °C (98.4 °F) (Temporal)   Resp 18   Ht 1.651 m (5' 5\")   Wt 83.4 kg (183 lb 13.8 oz)   LMP 08/07/2023 (Exact Date)   SpO2 97%   BMI 30.60 kg/m²     Cervical Exam  CRB in place    Fetal Assessment  Baseline Fetal Heart Rate (bpm): 145 bpm  Baseline Classification: Normal  Variability: Moderate (Between 6 and 25 BPM)  Pattern: Accelerations  FHR Category: Category I     Contraction Frequency: 1.5-4    Assessment/Plan:  - CRB in place, on pit  - CEFM, currently Category I  - Epidural as requested    D/w L&D chief resident Dr. Matty Martinez MD  PGY-3, Obstetrics and Gynecology    "

## 2024-04-22 NOTE — H&P
Obstetrical Admission History and Physical    Assessment/Plan    32 yo  at 37.0 by LMP c/w 19 wk US who presents for IOL in the setting of cHTN.     IOL  - Admit to L&D, consented and scanned  - Will induce with CRB + Pitocin  - Type and screen, CBC on admission  - Delivery plan: patient desires vaginal delivery, patient counseled on risks of labor, vaginal delivery, and possibility of C/S for varying maternal or fetal indications    cHTN  - Continue Labetalol 200 mg BID, pt not taking amlodipine 10 mg at home  - Baseline PEC labs wnl  - Last growth  36.2 EFW 2522 g 18% AC 15%    Anemia  - T+C 1u on admission  - On PO iron  - Fu admission Hgb     Maternal well-being  - Nubain/epidural per patient request    Fetal well-being  - CEFM; Cat 1 currently    GBS: neg  - ppx: not required    Postpartum care  - Contraception: depo vs nexplanon    d/w Dr. Blayne Heck MD, PGY-4     Medical Problems       Problem List       * (Principal) Encounter for elective induction of labor ([facility])    History of  delivery, currently pregnant in third trimester ([facility])    Overview Signed 2023  5:06 PM by BLANCHE Aguilera       35w IOL per patient for pre-e  M referral placed         History of gestational hypertension    Overview Signed 2023  5:06 PM by BLANCHE Aguilera       With  pregnancy          History of pre-eclampsia    Overview Addendum 2023  5:33 PM by Renee Varela MD       With  and  births  IOL at 35w due to pre-e  HELLP labs ordered at [facility]  ASA 81mg rx sent  23 Maternal echo ordered by [facility]         Maternal varicella, non-immune ([facility])    Overview Signed 2023  9:02 AM by BLANCHE Aguilera       Offer varivax PP         Chronic hypertension during pregnancy ([facility])    Overview Addendum 2024  9:40 AM by Renee Varela MD MD only due to cHTN on medication  HELLP labs WNL at [facility]  OB p/c 0.11  ASA 81mg rx sent  Patient has BP cuff at home; discussed checking Bps 1-2x a day and calling with elevated Bps  23 Patient reports BP at home in 140s/80s. Amlodipine increased to 10 mg Daily.   Will plan for serial growth USNs, Fetal testing at 32 weeks  24- Multiple high mild range BP in office. Will start on Labetalol 200 mg BID, check labs.   Plan for IOL at 37 weeks.          Anemia during pregnancy in third trimester ([facility])    Overview Signed 2024  9:39 AM by Renee Varela MD     Started on iron.                Subjective   34 yo  at 37.0 by LMP c/w 19 wk US who presents for IOL in the setting of cHTN.     Pregnancy c/b:  - cHTN: PEC labs neg, was Rx Amlodipine 10mg+ Labetalol 200 mg BID (only taking labetalol). Last growth  36.2 EFW 2522 g 18% AC 15%  - h/o PTB for preE  - Anemia, last Hgb 10.4,     Obstetrical History   OB History    Para Term  AB Living   4 2 1 1 1 2   SAB IAB Ectopic Multiple Live Births     1     2      # Outcome Date GA Lbr Harman/2nd Weight Sex Delivery Anes PTL Lv   4 Current            3 Term 22   2.155 kg F Vag-Spont   CRYSTAL      Complications: Gestational hypertension ([facility])   2 IAB            1  12 35w0d  2.268 kg M Vag-Spont   CRYSTAL      Complications: Preeclampsia ([facility])       Past Medical History  Past Medical History:   Diagnosis Date    Hypertension     Trichomonas infection 07/15/2011    at [facility] in pap        Past Surgical History   Past Surgical History:   Procedure Laterality Date    CT ANGIO NECK  2023    CT NECK ANGIO W AND WO IV CONTRAST [facility] CT    D&C FIRST TRIMESTER / TX INCOMPLETE / MISSED / SEPTIC / INDUCED          Social History  Social History     Tobacco Use    Smoking status: Former     Types: Cigarettes     Passive exposure: Never    Smokeless tobacco: Never   Substance Use Topics    Alcohol use: Not Currently     Substance and Sexual Activity   Drug Use Never        Allergies  Patient has no known allergies.     Medications  Medications Prior to Admission   Medication Sig Dispense Refill Last Dose    aspirin 81 mg EC tablet Take 2 tablets (162 mg) by mouth once daily. 60 tablet 11 Past Month    ferrous sulfate, 325 mg ferrous sulfate, tablet Take 1 tablet by mouth once daily in the morning. Take before meals. 60 tablet 1 4/21/2024 at 1200    labetalol (Normodyne) 200 mg tablet Take 1 tablet (200 mg) by mouth 2 times a day. 60 tablet 1 4/22/2024 at 0700    prenatal vitamin, iron-folic, 27 mg iron-800 mcg folic acid tablet Take 1 tablet by mouth once daily. 30 tablet 11 4/21/2024 at 1200    amLODIPine (Norvasc) 10 mg tablet TAKE 1 TABLET BY MOUTH EVERY DAY 90 tablet 1     polyethylene glycol (Glycolax, Miralax) 17 gram/dose powder Take 17 g by mouth 2 times a day as needed (constipation). (Patient not taking: Reported on 4/22/2024) 238 g 1 More than a month       Objective    Last Vitals  Temp Pulse Resp BP MAP O2 Sat   36.9 °C (98.4 °F) 80 18 (!) 143/89   100 %     Physical Examination  General: no acute distress  HEENT: normocephalic, atraumatic  Heart: warm and well perfused  Lungs: breathing comfortably on room air  Abdomen: gravid  SVE: 1/30/-3  Extremities: moving all extremities  Neuro: awake and conversant  Psych: appropriate mood and affect

## 2024-04-22 NOTE — ANESTHESIA PREPROCEDURE EVALUATION
Patient: Serafin Wall    Evaluation Method: In-person visit    Procedure Information    Date: 24  Procedure: Labor Consult         33 y.o.  at 37w0d presenting for IOL in setting of h/o cHTN.      Relevant Problems   Anesthesia (within normal limits)      Cardiac   (+) Chronic hypertension during pregnancy ([facility])      Pulmonary (within normal limits)      Neuro (within normal limits)      GI   (+) Gastroesophageal reflux disease without esophagitis      /Renal (within normal limits)      Liver (within normal limits)      Endocrine (within normal limits)      Hematology   (+) Anemia during pregnancy in third trimester ([facility])      GYN   (+) History of  delivery, currently pregnant in third trimester ([facility])       Clinical information reviewed:   Tobacco  Allergies  Meds   Med Hx  Surg Hx   Fam Hx  Soc Hx        NPO Detail:  No data recorded     OB/Gyn Evaluation    Present Pregnancy    Patient is pregnant now.   Obstetric History      (-) difficult neuraxial anesthesia                Physical Exam    Airway  Mallampati: III  TM distance: >3 FB  Neck ROM: full     Cardiovascular - normal exam     Dental - normal exam     Pulmonary - normal exam     Abdominal - normal exam             Anesthesia Plan    History of general anesthesia?: no  History of complications of general anesthesia?: unknown/emergency    ASA 3     epidural   (Discussed GA/Spinal, explained risks and benefits, she understands. )  The patient is not a current smoker.    Anesthetic plan and risks discussed with patient.  Use of blood products discussed with patient who consented to blood products.    Plan discussed with resident.

## 2024-04-22 NOTE — ANESTHESIA PREPROCEDURE EVALUATION
Patient: Serafin Wall    Evaluation Method: In-person visit    Procedure Information    Date: 24  Procedure: Labor Consult         33 y.o.  at 37w0d presenting for IOL in setting of h/o cHTN.      Relevant Problems   Anesthesia (within normal limits)      Cardiac   (+) Chronic hypertension during pregnancy ([facility])      Pulmonary (within normal limits)      Neuro (within normal limits)      GI   (+) Gastroesophageal reflux disease without esophagitis      /Renal (within normal limits)      Liver (within normal limits)      Endocrine (within normal limits)      Hematology   (+) Anemia during pregnancy in third trimester ([facility])      GYN   (+) History of  delivery, currently pregnant in third trimester ([facility])       Clinical information reviewed:   Tobacco  Allergies  Meds   Med Hx  Surg Hx   Fam Hx  Soc Hx        NPO Detail:  No data recorded     OB/Gyn Evaluation    Present Pregnancy    Patient is pregnant now.   Obstetric History      (-) difficult neuraxial anesthesia             Vitals:    24 1831   BP: (!) 164/92   Pulse: 71   Resp:    Temp:    SpO2:           Physical Exam    Airway  Mallampati: III  TM distance: >3 FB  Neck ROM: full     Cardiovascular - normal exam     Dental - normal exam     Pulmonary - normal exam     Abdominal - normal exam             Anesthesia Plan    History of general anesthesia?: no  History of complications of general anesthesia?: unknown/emergency    ASA 2     epidural   (Discussed GA/Spinal, explained risks and benefits, she understands. )  The patient is not a current smoker.    Anesthetic plan and risks discussed with patient.  Use of blood products discussed with patient who consented to blood products.    Plan discussed with resident.

## 2024-04-23 LAB
ERYTHROCYTE [DISTWIDTH] IN BLOOD BY AUTOMATED COUNT: 13.7 % (ref 11.5–14.5)
HCT VFR BLD AUTO: 28.4 % (ref 36–46)
HGB BLD-MCNC: 9.9 G/DL (ref 12–16)
MCH RBC QN AUTO: 30.9 PG (ref 26–34)
MCHC RBC AUTO-ENTMCNC: 34.9 G/DL (ref 32–36)
MCV RBC AUTO: 89 FL (ref 80–100)
NRBC BLD-RTO: 0 /100 WBCS (ref 0–0)
PLATELET # BLD AUTO: 336 X10*3/UL (ref 150–450)
RBC # BLD AUTO: 3.2 X10*6/UL (ref 4–5.2)
WBC # BLD AUTO: 20.9 X10*3/UL (ref 4.4–11.3)

## 2024-04-23 PROCEDURE — 2500000001 HC RX 250 WO HCPCS SELF ADMINISTERED DRUGS (ALT 637 FOR MEDICARE OP): Performed by: STUDENT IN AN ORGANIZED HEALTH CARE EDUCATION/TRAINING PROGRAM

## 2024-04-23 PROCEDURE — 88307 TISSUE EXAM BY PATHOLOGIST: CPT | Mod: TC,SUR | Performed by: STUDENT IN AN ORGANIZED HEALTH CARE EDUCATION/TRAINING PROGRAM

## 2024-04-23 PROCEDURE — 88307 TISSUE EXAM BY PATHOLOGIST: CPT | Performed by: PATHOLOGY

## 2024-04-23 PROCEDURE — 59409 OBSTETRICAL CARE: CPT

## 2024-04-23 PROCEDURE — 85027 COMPLETE CBC AUTOMATED: CPT | Performed by: STUDENT IN AN ORGANIZED HEALTH CARE EDUCATION/TRAINING PROGRAM

## 2024-04-23 PROCEDURE — 7210000002 HC LABOR PER HOUR

## 2024-04-23 PROCEDURE — 59409 OBSTETRICAL CARE: CPT | Performed by: OBSTETRICS & GYNECOLOGY

## 2024-04-23 PROCEDURE — 3E0E77Z INTRODUCTION OF ELECTROLYTIC AND WATER BALANCE SUBSTANCE INTO PRODUCTS OF CONCEPTION, VIA NATURAL OR ARTIFICIAL OPENING: ICD-10-PCS | Performed by: STUDENT IN AN ORGANIZED HEALTH CARE EDUCATION/TRAINING PROGRAM

## 2024-04-23 PROCEDURE — 2500000005 HC RX 250 GENERAL PHARMACY W/O HCPCS: Performed by: NURSE ANESTHETIST, CERTIFIED REGISTERED

## 2024-04-23 PROCEDURE — 2500000005 HC RX 250 GENERAL PHARMACY W/O HCPCS: Performed by: STUDENT IN AN ORGANIZED HEALTH CARE EDUCATION/TRAINING PROGRAM

## 2024-04-23 PROCEDURE — 7100000016 HC LABOR RECOVERY PER HOUR

## 2024-04-23 PROCEDURE — 36415 COLL VENOUS BLD VENIPUNCTURE: CPT | Performed by: STUDENT IN AN ORGANIZED HEALTH CARE EDUCATION/TRAINING PROGRAM

## 2024-04-23 PROCEDURE — 4A1H7CZ MONITORING OF PRODUCTS OF CONCEPTION, CARDIAC RATE, VIA NATURAL OR ARTIFICIAL OPENING: ICD-10-PCS | Performed by: STUDENT IN AN ORGANIZED HEALTH CARE EDUCATION/TRAINING PROGRAM

## 2024-04-23 PROCEDURE — 1100000001 HC PRIVATE ROOM DAILY

## 2024-04-23 PROCEDURE — 2500000004 HC RX 250 GENERAL PHARMACY W/ HCPCS (ALT 636 FOR OP/ED): Performed by: STUDENT IN AN ORGANIZED HEALTH CARE EDUCATION/TRAINING PROGRAM

## 2024-04-23 PROCEDURE — 10H07YZ INSERTION OF OTHER DEVICE INTO PRODUCTS OF CONCEPTION, VIA NATURAL OR ARTIFICIAL OPENING: ICD-10-PCS | Performed by: STUDENT IN AN ORGANIZED HEALTH CARE EDUCATION/TRAINING PROGRAM

## 2024-04-23 PROCEDURE — 51701 INSERT BLADDER CATHETER: CPT

## 2024-04-23 RX ORDER — ONDANSETRON HYDROCHLORIDE 2 MG/ML
4 INJECTION, SOLUTION INTRAVENOUS EVERY 6 HOURS PRN
Status: DISCONTINUED | OUTPATIENT
Start: 2024-04-23 | End: 2024-04-24 | Stop reason: HOSPADM

## 2024-04-23 RX ORDER — ONDANSETRON 4 MG/1
4 TABLET, FILM COATED ORAL EVERY 6 HOURS PRN
Status: DISCONTINUED | OUTPATIENT
Start: 2024-04-23 | End: 2024-04-24 | Stop reason: HOSPADM

## 2024-04-23 RX ORDER — ACETAMINOPHEN 325 MG/1
975 TABLET ORAL EVERY 6 HOURS
Status: DISCONTINUED | OUTPATIENT
Start: 2024-04-23 | End: 2024-04-24 | Stop reason: HOSPADM

## 2024-04-23 RX ORDER — LABETALOL HYDROCHLORIDE 5 MG/ML
20 INJECTION, SOLUTION INTRAVENOUS ONCE AS NEEDED
Status: DISCONTINUED | OUTPATIENT
Start: 2024-04-23 | End: 2024-04-24 | Stop reason: HOSPADM

## 2024-04-23 RX ORDER — TRANEXAMIC ACID 100 MG/ML
1000 INJECTION, SOLUTION INTRAVENOUS ONCE AS NEEDED
Status: DISCONTINUED | OUTPATIENT
Start: 2024-04-23 | End: 2024-04-24 | Stop reason: HOSPADM

## 2024-04-23 RX ORDER — DIPHENHYDRAMINE HCL 25 MG
25 CAPSULE ORAL EVERY 6 HOURS PRN
Status: DISCONTINUED | OUTPATIENT
Start: 2024-04-23 | End: 2024-04-24 | Stop reason: HOSPADM

## 2024-04-23 RX ORDER — CARBOPROST TROMETHAMINE 250 UG/ML
250 INJECTION, SOLUTION INTRAMUSCULAR ONCE AS NEEDED
Status: DISCONTINUED | OUTPATIENT
Start: 2024-04-23 | End: 2024-04-24 | Stop reason: HOSPADM

## 2024-04-23 RX ORDER — HYDRALAZINE HYDROCHLORIDE 20 MG/ML
5 INJECTION INTRAMUSCULAR; INTRAVENOUS ONCE AS NEEDED
Status: DISCONTINUED | OUTPATIENT
Start: 2024-04-23 | End: 2024-04-24 | Stop reason: HOSPADM

## 2024-04-23 RX ORDER — LOPERAMIDE HYDROCHLORIDE 2 MG/1
4 CAPSULE ORAL EVERY 2 HOUR PRN
Status: DISCONTINUED | OUTPATIENT
Start: 2024-04-23 | End: 2024-04-24 | Stop reason: HOSPADM

## 2024-04-23 RX ORDER — LIDOCAINE 560 MG/1
1 PATCH PERCUTANEOUS; TOPICAL; TRANSDERMAL
Status: DISCONTINUED | OUTPATIENT
Start: 2024-04-23 | End: 2024-04-24 | Stop reason: HOSPADM

## 2024-04-23 RX ORDER — POLYETHYLENE GLYCOL 3350 17 G/17G
17 POWDER, FOR SOLUTION ORAL 2 TIMES DAILY PRN
Status: DISCONTINUED | OUTPATIENT
Start: 2024-04-23 | End: 2024-04-24 | Stop reason: HOSPADM

## 2024-04-23 RX ORDER — IBUPROFEN 600 MG/1
600 TABLET ORAL EVERY 6 HOURS
Status: DISCONTINUED | OUTPATIENT
Start: 2024-04-23 | End: 2024-04-24 | Stop reason: HOSPADM

## 2024-04-23 RX ORDER — MISOPROSTOL 200 UG/1
800 TABLET ORAL ONCE AS NEEDED
Status: DISCONTINUED | OUTPATIENT
Start: 2024-04-23 | End: 2024-04-24 | Stop reason: HOSPADM

## 2024-04-23 RX ORDER — SIMETHICONE 80 MG
80 TABLET,CHEWABLE ORAL 4 TIMES DAILY PRN
Status: DISCONTINUED | OUTPATIENT
Start: 2024-04-23 | End: 2024-04-24 | Stop reason: HOSPADM

## 2024-04-23 RX ORDER — BISACODYL 10 MG/1
10 SUPPOSITORY RECTAL DAILY PRN
Status: DISCONTINUED | OUTPATIENT
Start: 2024-04-23 | End: 2024-04-24 | Stop reason: HOSPADM

## 2024-04-23 RX ORDER — METHYLERGONOVINE MALEATE 0.2 MG/ML
0.2 INJECTION INTRAVENOUS ONCE AS NEEDED
Status: DISCONTINUED | OUTPATIENT
Start: 2024-04-23 | End: 2024-04-24 | Stop reason: HOSPADM

## 2024-04-23 RX ORDER — OXYTOCIN/0.9 % SODIUM CHLORIDE 30/500 ML
60 PLASTIC BAG, INJECTION (ML) INTRAVENOUS ONCE AS NEEDED
Status: DISCONTINUED | OUTPATIENT
Start: 2024-04-23 | End: 2024-04-24 | Stop reason: HOSPADM

## 2024-04-23 RX ORDER — DIPHENHYDRAMINE HYDROCHLORIDE 50 MG/ML
25 INJECTION INTRAMUSCULAR; INTRAVENOUS EVERY 6 HOURS PRN
Status: DISCONTINUED | OUTPATIENT
Start: 2024-04-23 | End: 2024-04-24 | Stop reason: HOSPADM

## 2024-04-23 RX ORDER — OXYTOCIN 10 [USP'U]/ML
10 INJECTION, SOLUTION INTRAMUSCULAR; INTRAVENOUS ONCE AS NEEDED
Status: DISCONTINUED | OUTPATIENT
Start: 2024-04-23 | End: 2024-04-24 | Stop reason: HOSPADM

## 2024-04-23 RX ORDER — ADHESIVE BANDAGE
10 BANDAGE TOPICAL
Status: DISCONTINUED | OUTPATIENT
Start: 2024-04-23 | End: 2024-04-24 | Stop reason: HOSPADM

## 2024-04-23 RX ORDER — NIFEDIPINE 10 MG/1
10 CAPSULE ORAL ONCE AS NEEDED
Status: DISCONTINUED | OUTPATIENT
Start: 2024-04-23 | End: 2024-04-24 | Stop reason: HOSPADM

## 2024-04-23 RX ADMIN — LABETALOL HYDROCHLORIDE 200 MG: 100 TABLET, FILM COATED ORAL at 21:14

## 2024-04-23 RX ADMIN — ACETAMINOPHEN 975 MG: 325 TABLET ORAL at 14:45

## 2024-04-23 RX ADMIN — LABETALOL HYDROCHLORIDE 200 MG: 100 TABLET, FILM COATED ORAL at 08:52

## 2024-04-23 RX ADMIN — SODIUM CHLORIDE, SODIUM LACTATE, POTASSIUM CHLORIDE, AND CALCIUM CHLORIDE 500 ML: 600; 310; 30; 20 INJECTION, SOLUTION INTRAVENOUS at 00:17

## 2024-04-23 RX ADMIN — Medication 14 ML/HR: at 03:00

## 2024-04-23 RX ADMIN — SODIUM CHLORIDE, POTASSIUM CHLORIDE, SODIUM LACTATE AND CALCIUM CHLORIDE 125 ML/HR: 600; 310; 30; 20 INJECTION, SOLUTION INTRAVENOUS at 01:04

## 2024-04-23 RX ADMIN — IBUPROFEN 600 MG: 600 TABLET, FILM COATED ORAL at 07:50

## 2024-04-23 RX ADMIN — ACETAMINOPHEN 975 MG: 325 TABLET ORAL at 21:14

## 2024-04-23 RX ADMIN — BENZOCAINE AND LEVOMENTHOL 1 APPLICATION: 200; 5 SPRAY TOPICAL at 12:12

## 2024-04-23 RX ADMIN — Medication 1 EACH: at 12:12

## 2024-04-23 RX ADMIN — IBUPROFEN 600 MG: 600 TABLET, FILM COATED ORAL at 21:14

## 2024-04-23 RX ADMIN — IBUPROFEN 600 MG: 600 TABLET, FILM COATED ORAL at 14:45

## 2024-04-23 RX ADMIN — AMLODIPINE BESYLATE 10 MG: 10 TABLET ORAL at 08:52

## 2024-04-23 RX ADMIN — ACETAMINOPHEN 975 MG: 325 TABLET ORAL at 07:50

## 2024-04-23 ASSESSMENT — PAIN - FUNCTIONAL ASSESSMENT: PAIN_FUNCTIONAL_ASSESSMENT: 0-10

## 2024-04-23 ASSESSMENT — PAIN SCALES - GENERAL
PAINLEVEL_OUTOF10: 0 - NO PAIN
PAINLEVEL_OUTOF10: 0 - NO PAIN
PAINLEVEL_OUTOF10: 6
PAINLEVEL_OUTOF10: 6

## 2024-04-23 ASSESSMENT — PAIN DESCRIPTION - DESCRIPTORS
DESCRIPTORS: CRAMPING
DESCRIPTORS: CRAMPING

## 2024-04-23 NOTE — CARE PLAN
The patient's goals for the shift include  care and rest    The clinical goals for the shift include WNL vital signs and assessments      Problem: Safety - Adult  Goal: Free from fall injury  Outcome: Progressing     Problem: Postpartum  Goal: Experiences normal postpartum course  Outcome: Progressing  Goal: Appropriate maternal -  bonding  Outcome: Progressing  Goal: Establish and maintain infant feeding pattern for adequate nutrition  Outcome: Progressing  Goal: Incisions, wounds, or drain sites healing without S/S of infection  Outcome: Progressing  Goal: No s/sx infection  Outcome: Progressing  Goal: No s/sx of hemorrhage  Outcome: Progressing  Goal: Minimal s/sx of HDP and BP<160/110  Outcome: Progressing

## 2024-04-23 NOTE — SIGNIFICANT EVENT
"Labor Progress Note    Subjective: Patient resting comfortably. Requesting cervical exam and AROM.    Objective:  Vitals: /73   Pulse 80   Temp 36.2 °C (97.2 °F) (Temporal)   Resp 18   Ht 1.651 m (5' 5\")   Wt 83.4 kg (183 lb 13.8 oz)   LMP 08/07/2023 (Exact Date)   SpO2 99%   BMI 30.60 kg/m²   Cervical Exam  Dilation: 5  Effacement (%): 60  Fetal Station: -3  Presentation: Vertex (per bedside U/S by  MD Juan)  Method: Manual  OB Examiner: MD Morris  Fetal Assessment  Movement: Present  Mode: External US  Baseline Fetal Heart Rate (bpm): 150 bpm  Baseline Classification: Normal  Variability: Moderate (Between 6 and 25 BPM) (periods of minimal)  Pattern: Accelerations, Prolonged decelerations  Pattern Observations: RN at bedside readjusting EFM. EFM occasionally tracking maternal hr  FHR Category: Category I  Multiple Births: No     Latent labor  AROM'd for clear  Continue pitocin per protocol  CEFM, currently Category I  Epidural infusing    Mercedes Caceres MD PGY-1  Obstetrics & Gynecology    "

## 2024-04-23 NOTE — SIGNIFICANT EVENT
Patient meets criteria for home monitoring of blood pressure post discharge.  Reason: past medical history of gestational hypertension,  past medical history of preeclampsia,  history of chronic hypertension. Met with patient to assess for availability of home BP monitor.  Patient stated she owns home BP monitor. . Patient educated on importance of continuing to monitor BP at home, recording BP on home monitoring log and s/sx of when to call her provider.  Pt verbalized understanding the above information.

## 2024-04-23 NOTE — SIGNIFICANT EVENT
Pt feeling unremitting rectal pressure. Epidural, amnioinfusiuon, and pitocin infusing per protocol.    Vitals:    24 0602   BP: 129/80   Pulse: 100   Resp:    Temp:    SpO2:       Cervical Exam  Dilation: Lip/rim (Comment)  Effacement (%): 100  Fetal Station: 0  Presentation: Vertex (per bedside U/S by  MD Juan)  Method: Manual  OB Examiner: MD Dony  Fetal Assessment  Movement: Present  Mode: External US  Baseline Fetal Heart Rate (bpm): 135 bpm  Baseline Classification: Normal  Variability: Moderate (Between 6 and 25 BPM)  Pattern: Variable decelerations  Pattern Observations: Pt leaning forward in bed. RN at bedside readjusting EFM. EFM tracing maternal HR  FHR Category: Category I  Multiple Births: No      Contraction Frequency: 1-3     Active Labor  -Monitor for cervical change  -Anticipate   -Continue pit and amnioinfusion per protocol    Jorge Napoles MD PGY-3

## 2024-04-23 NOTE — LACTATION NOTE
Lactation Consultant Note  Lactation Consultation  Reason for Consult: Initial assessment  Consultant Name: Shelby Perez RN IBCLC    Maternal Information  Has mother  before?: Yes  How long did the mother previously breastfeed?: pumped for a few months with her twelve year old- and still latching her two year old periodically  Infant to breast within first 2 hours of birth?: Yes    Maternal Assessment  Breast Assessment: Other (Comment) (deferred)  Nipple Assessment: Other (Comment) (deferred)    Infant Assessment  Infant Behavior: Deep sleep  Infant Assessment:  (suck not assessed with this visit)    Feeding Assessment  Nutrition Source: Breastmilk  Feeding Method: Nursing at the breast, Feeding expressed breastmilk  Unable to assess infant feeding at this time: Maternal request (infant was fed 15ml of ebm one hour ago)    LATCH TOOL       Breast Pump       Other OB Lactation Tools       Patient Follow-up  Inpatient Lactation Follow-up Needed : Yes    Other OB Lactation Documentation  Maternal Risk Factors: Hypertension (chronic)  Infant Risk Factors: Early term birth 37-39 weeks    Recommendations/Summary  A feeding was not observed with this visit. Mother stated that she had pumped and fed infant 15ml of her own ebm one hour ago. A bottle of formula was also opened at the bedside. I questioned mother as to why she had attempted to offer infant formula and her own ebm. She replied that infant had not eaten since shortly after delivery. Mother shared that infant latched and fed well on L&D. Educated mother on the size of an infants stomach and the fact that she delivered her infant quickly in three pushes that infant may still have mucus and amniotic fluid present in his stomach at this time. Reviewed with mother typical feeding patterns and behaviors of newborns in the first day and beyond. Discussed early infant hunger cues and the importance of latching infant both deeply and properly for her  comfort and effective milk transfer. Instructed mother to call when next latching infant for an observation. Mother has a breast pump for home.

## 2024-04-23 NOTE — L&D DELIVERY NOTE
OB Delivery Note  2024  Serafin Wall  33 y.o.   Vaginal, Spontaneous      Normal spontaneous vaginal delivery. EBL 250cc. No tears.    Gestational Age: 37w1d  /Para:   Quantitative Blood Loss: Admission to Discharge: 293 mL (2024  7:59 AM - 2024  8:19 PM)    Seble Wall [[id number]]      Labor Events    Rupture date/time: 2024  Rupture type: Artificial  Fluid color: Clear  Fluid odor: None  Labor type: Induced Onset of Labor  Labor allowed to proceed with plans for an attempted vaginal birth?: Yes  Induction: Balbuena/EASI, Oxytocin, AROM  First cervical ripening date/time: 2024 1020  Induction indications: Hypertensive Disorder of Pregnancy  Complications: None       Labor Event Times    Labor onset date/time: 2024 0759  Dilation complete date/time: 2024 0642  Start pushing date/time: 2024 0647       Labor Length    1st stage: 22h 43m  2nd stage: 0h 07m  3rd stage: 0h 06m       Placenta    Placenta delivery date/time: 2024 0655  Placenta removal: Spontaneous  Placenta appearance: Intact  Placenta disposition: pathology       Cord    Vessels: 3 vessels  Complications: Nuchal  Nuchal intervention: reduced  Number of loops: 1  Delayed cord clamping?: Yes  Cord clamped date/time: 2024 0651  Cord blood disposition: Lab  Gases sent?: No  Stem cell collection (by provider): No       Lacerations    Episiotomy: None  Perineal laceration: None  Other lacerations?: No  Repair suture: None       Anesthesia    Method: Epidural       Operative Delivery    Forceps attempted?: No  Vacuum extractor attempted?: No       Shoulder Dystocia    Shoulder dystocia present?: No        Delivery    Time head delivered: 2024 06:49:00  Birth date/time: 2024 06:49:00  Delivery type: Vaginal, Spontaneous  Complications: None       Resuscitation    Method: Tactile stimulation, Suctioning       Apgars    Living status: Living  Apgar Component Scores:  1 min.:  5  min.:  10 min.:  15 min.:  20 min.:    Skin color:  0  1       Heart rate:  2  2       Reflex irritability:  2  2       Muscle tone:  2  2       Respiratory effort:  2  2       Total:  8  9       Apgars assigned by: HESHAM BLANCHARD RN       Delivery Providers    Delivering clinician: Diya Burton MD   Provider Role    Raquel Leiva RN Delivery Nurse    Gideon Blanchard RN Nursery Nurse    Mercedes Caceres MD Resident                 Mercedes Caceres MD

## 2024-04-23 NOTE — SIGNIFICANT EVENT
"Labor Progress Note    Subjective: Patient resting comfortably with epidural infusing.    Objective:  Vitals: /77   Pulse 93   Temp 36.4 °C (97.5 °F) (Temporal)   Resp 18   Ht 1.651 m (5' 5\")   Wt 83.4 kg (183 lb 13.8 oz)   LMP 08/07/2023 (Exact Date)   SpO2 97%   BMI 30.60 kg/m²   Cervical Exam  Dilation: 6  Effacement (%): 70  Fetal Station: -3  Presentation: Vertex (per bedside U/S by  MD Juan)  Method: Manual  OB Examiner: MD Morris  Fetal Assessment  Movement: Present  Mode: External US  Baseline Fetal Heart Rate (bpm): 145 bpm  Baseline Classification: Normal  Variability: Moderate (Between 6 and 25 BPM)  Pattern: Accelerations, Prolonged decelerations  Pattern Observations: Pt leaning forward in bed. EFM tracking maternal HR. RN at bedside readjusting EFM  FHR Category: Category I  Multiple Births: No     Active labor  Continue pitocin per protocol  CEFM, currently Category I  Epidural infusing    Mercedes Caceres MD PGY-1  Obstetrics & Gynecology    "

## 2024-04-23 NOTE — SIGNIFICANT EVENT
"Pt feeling more rectal pressure with ctx, epidural, amnioinfusion, and pitocin infusing.    Visit Vitals  /62   Pulse 103   Temp 36.5 °C (97.7 °F) (Temporal)   Resp 18   Ht 1.651 m (5' 5\")   Wt 83.4 kg (183 lb 13.8 oz)   LMP 2023 (Exact Date)   SpO2 99%   BMI 30.60 kg/m²   OB Status Pregnant   Smoking Status Former   BSA 1.96 m²      Cervical Exam  Dilation: 9  Effacement (%): 100  Fetal Station: 0  Presentation: Vertex (per bedside U/S by  MD Juan)  Method: Manual  OB Examiner: MD Dony  Fetal Assessment  Movement: Present  Mode: Fetal scalp electrode  Baseline Fetal Heart Rate (bpm): 140 bpm  Baseline Classification: Normal  Variability: Moderate (Between 6 and 25 BPM)  Pattern: Accelerations, Variable decelerations  Pattern Observations: Pt leaning forward in bed. RN at bedside readjusting EFM. EFM tracing maternal HR  FHR Category: Category I  Multiple Births: No      Contraction Frequency: 2-4.5      Active Labor  -Continue pit per protocol   -Recheck SVE in 1 hour  -Anticipate     Jorge Napoles MD PGY-3  "

## 2024-04-23 NOTE — SIGNIFICANT EVENT
"To bedside. Pt feeling more pressure with epidural infusing.     Visit Vitals  /80   Pulse 102   Temp 36.4 °C (97.5 °F) (Temporal)   Resp 16   Ht 1.651 m (5' 5\")   Wt 83.4 kg (183 lb 13.8 oz)   LMP 08/07/2023 (Exact Date)   SpO2 97%   BMI 30.60 kg/m²   OB Status Pregnant   Smoking Status Former   BSA 1.96 m²      Cervical Exam  Dilation: 6  Effacement (%): 70  Fetal Station: -3  Presentation: Vertex (per bedside U/S by  MD Juan)  Method: Manual  OB Examiner: MD Dony  Fetal Assessment  Movement: Present  Mode: External US  Baseline Fetal Heart Rate (bpm): 145 bpm  Baseline Classification: Normal  Variability: Moderate (Between 6 and 25 BPM) (periods of minimal)  Pattern: Variable decelerations  Pattern Observations: Pt leaning forward in bed. EFM tracking maternal HR. RN at bedside readjusting EFM  FHR Category: Category I  Multiple Births: No      Contraction Frequency: 2-3.5     Active Labor  -Continue pit per protocol  -CEFM, category 1  -Recheck in 2 hours, place internal monitors if unchanged     Jorge Napoles MD PGY-3  "

## 2024-04-23 NOTE — SIGNIFICANT EVENT
"To bedside due to recurrent deep variable decelerations on FHT. Moderate variability noted between ctx. Pt continuing to feel pressure with ctx.    Visit Vitals  /81   Pulse 101   Temp 36.9 °C (98.4 °F) (Temporal)   Resp 18   Ht 1.651 m (5' 5\")   Wt 83.4 kg (183 lb 13.8 oz)   LMP 08/07/2023 (Exact Date)   SpO2 97%   BMI 30.60 kg/m²   OB Status Pregnant   Smoking Status Former   BSA 1.96 m²     Cervical Exam  Dilation: 6  Effacement (%): 70  Fetal Station: -3  Presentation: Vertex (per bedside U/S by  MD Juan)  Method: Manual  OB Examiner: MD Dony  Fetal Assessment  Movement: Present  Mode: Fetal scalp electrode  Baseline Fetal Heart Rate (bpm): 140 bpm  Baseline Classification: Normal  Variability: Moderate (Between 6 and 25 BPM)  Pattern: Variable decelerations  Pattern Observations: Pt leaning forward in bed. RN at bedside readjusting EFM. EFM tracing maternal HR  FHR Category: Category I  Multiple Births: No      Contraction Frequency: 3-5 (per pt report/ RN palpating. IUPC not working appropriately. MD to bedside to readjust)      Active Labor  -FSE IUPC placed, amnioinfusion started   -Pit continued per protocol  -CEFM cat II with moderate variability, monitor for improvement with amnioinfusion as above  -Monitor for cervical change    Jorge Napoles MD PGY-3  "

## 2024-04-24 VITALS
WEIGHT: 183.86 LBS | HEIGHT: 65 IN | DIASTOLIC BLOOD PRESSURE: 88 MMHG | HEART RATE: 83 BPM | SYSTOLIC BLOOD PRESSURE: 134 MMHG | TEMPERATURE: 97.9 F | RESPIRATION RATE: 16 BRPM | OXYGEN SATURATION: 97 % | BODY MASS INDEX: 30.63 KG/M2

## 2024-04-24 PROBLEM — Z30.017 NEXPLANON INSERTION: Status: ACTIVE | Noted: 2024-04-24

## 2024-04-24 PROCEDURE — 2500000005 HC RX 250 GENERAL PHARMACY W/O HCPCS: Performed by: NURSE PRACTITIONER

## 2024-04-24 PROCEDURE — 0JHF3HZ INSERTION OF CONTRACEPTIVE DEVICE INTO LEFT UPPER ARM SUBCUTANEOUS TISSUE AND FASCIA, PERCUTANEOUS APPROACH: ICD-10-PCS | Performed by: NURSE PRACTITIONER

## 2024-04-24 PROCEDURE — 11981 INSERTION DRUG DLVR IMPLANT: CPT | Performed by: NURSE PRACTITIONER

## 2024-04-24 PROCEDURE — 2500000001 HC RX 250 WO HCPCS SELF ADMINISTERED DRUGS (ALT 637 FOR MEDICARE OP): Performed by: STUDENT IN AN ORGANIZED HEALTH CARE EDUCATION/TRAINING PROGRAM

## 2024-04-24 PROCEDURE — 2500000004 HC RX 250 GENERAL PHARMACY W/ HCPCS (ALT 636 FOR OP/ED): Performed by: NURSE PRACTITIONER

## 2024-04-24 RX ORDER — LIDOCAINE HYDROCHLORIDE 10 MG/ML
3 INJECTION INFILTRATION; PERINEURAL ONCE AS NEEDED
Status: COMPLETED | OUTPATIENT
Start: 2024-04-24 | End: 2024-04-24

## 2024-04-24 RX ADMIN — LIDOCAINE HYDROCHLORIDE 30 MG: 10 INJECTION, SOLUTION INFILTRATION; PERINEURAL at 13:45

## 2024-04-24 RX ADMIN — AMLODIPINE BESYLATE 10 MG: 10 TABLET ORAL at 09:27

## 2024-04-24 RX ADMIN — LABETALOL HYDROCHLORIDE 200 MG: 100 TABLET, FILM COATED ORAL at 09:28

## 2024-04-24 RX ADMIN — IBUPROFEN 600 MG: 600 TABLET, FILM COATED ORAL at 09:33

## 2024-04-24 RX ADMIN — IBUPROFEN 600 MG: 600 TABLET, FILM COATED ORAL at 04:14

## 2024-04-24 RX ADMIN — ACETAMINOPHEN 975 MG: 325 TABLET ORAL at 09:32

## 2024-04-24 RX ADMIN — ACETAMINOPHEN 975 MG: 325 TABLET ORAL at 04:14

## 2024-04-24 RX ADMIN — ETONOGESTREL 1 EACH: 68 IMPLANT SUBCUTANEOUS at 13:45

## 2024-04-24 ASSESSMENT — PAIN SCALES - GENERAL
PAINLEVEL_OUTOF10: 5 - MODERATE PAIN

## 2024-04-24 ASSESSMENT — PAIN DESCRIPTION - DESCRIPTORS
DESCRIPTORS: CRAMPING

## 2024-04-24 NOTE — LACTATION NOTE
Lactation Consultant Note  Lactation Consultation  Reason for Consult: Follow-up assessment  Consultant Name: DELBERT Sands    Maternal Information       Maternal Assessment  Breast Assessment: Medium, Soft, Symmetrical  Nipple Assessment: Intact, Erect    Infant Assessment  Infant Behavior: Deep sleep    Feeding Assessment       LATCH TOOL       Breast Pump       Other OB Lactation Tools       Patient Follow-up       Other OB Lactation Documentation  Maternal Risk Factors: Age over 30, primiparity  Infant Risk Factors: Early term birth 37-39 weeks, Prelacteal feeds    Recommendations/Summary    This mother reports that she has decided to formula-feed due to her infant wanting to feed frequently. (There are 8 documented breastfeedings and 1 pumped breast milk-15 ml.) The mother states that she was able to pump 15 ml yesterday, however, feels that the infant is more satisfied with the formula. I explained that this is a good amount for this stage of lactation. The mother is offered assistance with breastfeeding as needed.

## 2024-04-24 NOTE — PROCEDURES
General    Date/Time: 4/24/2024 1:44 PM    Performed by: SARINA Chaney  Authorized by: SARINA Chaney    Consent:     Consent obtained:  Verbal and written    Consent given by:  Patient    Risks, benefits, and alternatives were discussed: yes      Risks discussed:  Bleeding, infection and pain    Alternatives discussed:  No treatment, delayed treatment and alternative treatment  Universal protocol:     Procedure explained and questions answered to patient or proxy's satisfaction: yes      Relevant documents present and verified: yes      Required blood products, implants, devices, and special equipment available: yes      Site/side marked: yes      Immediately prior to procedure, a time out was called: yes      Patient identity confirmed:  Verbally with patient  Indications:     Indications:  Request for subdermal LARC  Pre-procedure details:     Skin preparation:  Povidone-iodine  Sedation:     Sedation type:  None  Anesthesia:     Anesthesia method:  Local infiltration    Local anesthetic:  Lidocaine 1% w/o epi  Procedure specific details:      Nexplanon placement      Risks, benefits and alternatives were discussed with the patient. We discussed possible complications and risks, including infection, bleeding, pain, tingling, failure, migration of implant, increased risk of ectopic should pregnancy occur and inability to remove implant. Electronic consent was obtained prior to the procedure and is detailed in the patient's record.     Procedure Note: Patient placed in semi-fowlers position. 3 ml of 1% lidocaine was injected just under the skin at marked insertion site.  The skin was then prepped with betadine.   Using standard technique, the implant was inserted in the inner side of the patient's non-dominant (LEFT) upper arm. Insertion was confirmed by visual inspection of the tip of the needle and palpation of the patient's arm. No blood loss.  Post insertion precautions/expectations were  discussed with the patient.  SARINA Chaney     Post-procedure details:     Procedure completion:  Tolerated well, no immediate complications

## 2024-04-24 NOTE — PROGRESS NOTES
Postpartum Progress Note      Assessment/Plan     Serafin Wall is a 33 y.o.,  initially presented for IOL ISO cHTN.  She had a Vaginal, Spontaneous   delivery on 2024  at 37w1d and is now postpartum day 1.    #Anemia in pregnancy  - Hg 9.9 on admission,   - for PO Fe on DC    #cHTN  - on amlodipine 10 daily and 200mg labetalol BID  - BP's low mild range to high normotensive  - asx  - discussed staying for 3 days after delivery, desires DC today.    #Postpartum  - continue routine postpartum care  - pain well controlled on po medications  - DVT Score: 3 ; SCDs  - The patient's blood type is O POS. The baby's blood type is O POS . Rhogam is not indicated.    #Maternal Well-Being  - emotional support provided  - bonding with infant @ bedside  - Contraception: Nexplanon    # Feeding  - breastfeeding/pumping encouraged; lactation consult prn    #Dispo  - Anticipate DC PPD#3 pending BP control.  - The signs and symptoms of PEC were reviewed with the patient, including unrelenting headache, vision changes/blurred vision, and RUQ pain  - BP cuff for home for checking BP twice a day  - Pt instructed to call primary OB if SBP > 160 or DBP > 110 or if development of PEC symptoms   - On discharge, follow up with primary OB in:       - 2-5 days for BP check       - 4-6 week for post-partum visit     Principal Problem:     (normal spontaneous vaginal delivery) ([facility])  Active Problems:    Chronic hypertension during pregnancy ([facility])    Anemia during pregnancy in third trimester ([facility])    Subjective   Denies HA, N/V, RUQ pain, vision changes.   denies dizziness/lightheadedness/LOC/uncontrolled bleeding.     Pt desires DC today 2/2 to other kids @ home. [place]     Meeting all postpartum milestones- ambulating independently, passing flatus, tolerating PO intake, lochia light, voiding spontaneously, and pain well controlled with PO meds.    Objective   Physical  Exam:  General: well appearing, well nourished, postpartum  Obstetric: fundus firm 2 below umbilicus, lochia light  Skin: Warm, dry; no rashes/lesions/erythema  Breast: No masses, nipple discharge  Neuro: A/Ox3, no gross motor deficit   GI: no distension, appropriately tender, soft, +BS  Respiratory: Even and unlabored on RA, LSCTA BL  Cardiovascular: No BLE edema; No erythema, warmth  Psych: appropriate mood and affect, conversational    Last Vitals:  Temp Pulse Resp BP MAP Pulse Ox   36.6 °C (97.9 °F) 83 16 134/88   97 %       Vitals Min/Max Last 24 Hours:  Temp  Min: 36 °C (96.8 °F)  Max: 37.1 °C (98.8 °F)  Pulse  Min: 80  Max: 99  Resp  Min: 16  Max: 20  BP  Min: 126/81  Max: 142/88    Lab Data:  Lab Results   Component Value Date    WBC 20.9 (H) 04/23/2024    HGB 9.9 (L) 04/23/2024    HCT 28.4 (L) 04/23/2024     04/23/2024

## 2024-04-24 NOTE — ANESTHESIA POSTPROCEDURE EVALUATION
Patient: Serafin Wall    Procedure Summary       Date: 24 Room / Location:     Anesthesia Start:  Anesthesia Stop: 24    Procedure: Labor Analgesia Diagnosis:     Scheduled Providers:  Responsible Provider: Sergio Langston MD    Anesthesia Type: epidural ASA Status: 3            Anesthesia Type: epidural        Anesthesia Post Evaluation    Patient location during evaluation: bedside  Patient participation: complete - patient participated  Level of consciousness: awake and alert  Pain management: adequate  Airway patency: patent  Cardiovascular status: acceptable  Respiratory status: acceptable  Hydration status: acceptable  Postoperative Nausea and Vomiting: none  Comments: Serafin Wall is a 33 y.o., , who had a Vaginal, Spontaneous delivery on 2024 at 37w1d and is now POD1.    She had Neuraxial Anesthesia without immediate complications noted.       Pain well controlled    ---------------------------               24                      1138         ---------------------------   BP:          134/88         Pulse:         83           Resp:          16           Temp:   36.6 °C (97.9 °F)   SpO2:          97%         ---------------------------    Neuraxial site assessed. No visible redness or swelling or drainage. Patient able to ambulate and move all extremities without difficulty. Able to void. No complaints of nausea/vomiting. Tolerating PO intake well. No s/sx of PDPH.     Anesthesia will sign off     LEXUS Tadeo         No notable events documented.

## 2024-04-24 NOTE — CARE PLAN
The patient's goals for the shift include  care and rest    The clinical goals for the shift include Adequate pain control, normal VS (BP<160/110), normal transition to post partum    Problem: Safety - Adult  Goal: Free from fall injury  Outcome: Met     Problem: Postpartum  Goal: Experiences normal postpartum course  Outcome: Met  Goal: Appropriate maternal -  bonding  Outcome: Met  Goal: Establish and maintain infant feeding pattern for adequate nutrition  Outcome: Met  Goal: Incisions, wounds, or drain sites healing without S/S of infection  Outcome: Met  Goal: No s/sx infection  Outcome: Met  Goal: No s/sx of hemorrhage  Outcome: Met  Goal: Minimal s/sx of HDP and BP<160/110  Outcome: Met

## 2024-04-24 NOTE — NURSING NOTE
Offered mother MMR vaccine, as her Rubella status is non-immune. Educated her on importance of receiving vaccine at discharge. Mother refused and said she will get the vaccine at her post partum follow-up.

## 2024-04-24 NOTE — DISCHARGE SUMMARY
Discharge Summary    Admission Date: 2024  Discharge Date: 24  Discharge Diagnosis:  (normal spontaneous vaginal delivery) ([facility])     Patient Active Problem List   Diagnosis    History of  delivery, currently pregnant in third trimester ([facility])    History of gestational hypertension    History of pre-eclampsia    Maternal varicella, non-immune ([facility])    Chronic hypertension during pregnancy ([facility])    Anemia during pregnancy in third trimester ([facility])     (normal spontaneous vaginal delivery) ([facility])    Gastroesophageal reflux disease without esophagitis    Nexplanon insertion       Hospital Course  Serafin Wall is a 33 y.o.,     Initially presented for: IOL ISO cHTN    Admission Date: 2024    Delivery Date: 2024  6:49 AM     Delivery type: Vaginal, Spontaneous      GA at delivery: 37w1d    Outcome: Living     Anesthesia during delivery: Epidural     Intrapartum complications: None     Feeding method: Breastfeeding Status: Unknown    Contraception: Nexplanon inserted today    Rhogam: The patient's blood type is O POS. The baby's blood type is O POS . Rhogam is not indicated.     Now postpartum day: 1.    Hospital course n/f:  #Anemia in pregnancy  - Hg 9.9 on admission,   - for PO Fe on DC  - asymptomatic      #cHTN  - on amlodipine 10 daily and 200mg labetalol BID  - hx PEC w/ 11 y/o  - BP's low mild range to high normotensive  - asx  - discussed staying for 3 days after delivery, desires DC today.    I have reviewed with the patient the standard 3 day stay for hypertensive disorders and the risks/benefits of early discharge, including death, stroke, seizure, and readmission. I strongly recommended an additional day of inpatient monitoring especially considering the pt's hx of PEC. Pt declines further monitoring at this time and requests early discharge today. No severe range BPs in > 24hrs. OK for DC today and close follow up.    PP course otherwise  uneventful.  Meeting all postpartum milestones- ambulating independently, passing flatus, tolerating PO intake, lochia light, voiding spontaneously, and pain well controlled with PO meds.     Dispo  OK for DC today and BP check tomorrow @ baby's appt  - The signs and symptoms of PEC were reviewed with the patient, including unrelenting headache, vision changes/blurred vision, and RUQ pain  - BP cuff for home for checking BP twice a day  - Pt instructed to call primary OB if SBP > 160 or DBP > 110 or if development of PEC symptoms   - On discharge, follow up with primary OB in:       - 2-5 days for BP check       - 4-6 week for post-partum visit     Pertinent Physical Exam At Time of Discharge  General: well appearing, well nourished, postpartum  Obstetric: fundus firm below umbilicus, lochia light  Skin: Warm, dry; no rashes/lesions/erythema  Breast: No masses, nipple discharge  Neuro: A/Ox3, conversational, no gross motor deficit   GI: no distension, appropriately tender, soft, +BS  Respiratory: Even and unlabored on RA, LSCTA BL  Cardiovascular: No BLE edema; No erythema, warmth  Psych: appropriate mood and affect       Your medication list        CONTINUE taking these medications        Instructions Last Dose Given Next Dose Due   amLODIPine 10 mg tablet  Commonly known as: Norvasc      TAKE 1 TABLET BY MOUTH EVERY DAY       ferrous sulfate (325 mg ferrous sulfate) tablet      Take 1 tablet by mouth once daily in the morning. Take before meals.       labetalol 200 mg tablet  Commonly known as: Normodyne      Take 1 tablet (200 mg) by mouth 2 times a day.       prenatal vitamin (iron-folic) 27 mg iron-800 mcg folic acid tablet      Take 1 tablet by mouth once daily.              STOP taking these medications      aspirin 81 mg EC tablet        polyethylene glycol 17 gram/dose powder  Commonly known as: Glycolax, Miralax                   Outpatient Follow-Up  No future appointments.    Gayatri Saleem, APRN-CNP

## 2024-04-26 LAB
BLOOD EXPIRATION DATE: NORMAL
DISPENSE STATUS: NORMAL
LABORATORY COMMENT REPORT: NORMAL
PATH REPORT.FINAL DX SPEC: NORMAL
PATH REPORT.GROSS SPEC: NORMAL
PATH REPORT.RELEVANT HX SPEC: NORMAL
PATH REPORT.TOTAL CANCER: NORMAL
PRODUCT BLOOD TYPE: 5100
PRODUCT CODE: NORMAL
UNIT ABO: NORMAL
UNIT NUMBER: NORMAL
UNIT RH: NORMAL
UNIT VOLUME: 350
XM INTEP: NORMAL

## 2024-05-01 DIAGNOSIS — O10.919 CHRONIC HYPERTENSION DURING PREGNANCY (HHS-HCC): ICD-10-CM

## 2024-05-01 RX ORDER — LABETALOL 200 MG/1
1 TABLET, FILM COATED ORAL 2 TIMES DAILY
Qty: 180 TABLET | Refills: 1 | Status: SHIPPED | OUTPATIENT
Start: 2024-05-01

## 2024-05-23 ENCOUNTER — APPOINTMENT (OUTPATIENT)
Dept: OBSTETRICS AND GYNECOLOGY | Facility: CLINIC | Age: 34
End: 2024-05-23
Payer: COMMERCIAL